# Patient Record
Sex: MALE | Race: WHITE | NOT HISPANIC OR LATINO | Employment: STUDENT | URBAN - METROPOLITAN AREA
[De-identification: names, ages, dates, MRNs, and addresses within clinical notes are randomized per-mention and may not be internally consistent; named-entity substitution may affect disease eponyms.]

---

## 2019-02-07 ENCOUNTER — OFFICE VISIT (OUTPATIENT)
Dept: FAMILY MEDICINE CLINIC | Facility: CLINIC | Age: 24
End: 2019-02-07

## 2019-02-07 VITALS
WEIGHT: 167 LBS | HEART RATE: 68 BPM | TEMPERATURE: 97.8 F | DIASTOLIC BLOOD PRESSURE: 70 MMHG | BODY MASS INDEX: 22.62 KG/M2 | SYSTOLIC BLOOD PRESSURE: 120 MMHG | HEIGHT: 72 IN | RESPIRATION RATE: 16 BRPM

## 2019-02-07 DIAGNOSIS — S99.922A INJURY OF TOE ON LEFT FOOT, INITIAL ENCOUNTER: ICD-10-CM

## 2019-02-07 DIAGNOSIS — K01.1 IMPACTED TOOTH: Primary | ICD-10-CM

## 2019-02-07 PROCEDURE — 99203 OFFICE O/P NEW LOW 30 MIN: CPT | Performed by: NURSE PRACTITIONER

## 2019-02-07 RX ORDER — AMOXICILLIN 875 MG/1
875 TABLET, COATED ORAL 2 TIMES DAILY
Qty: 14 TABLET | Refills: 0 | Status: SHIPPED | OUTPATIENT
Start: 2019-02-07 | End: 2019-02-14

## 2019-02-07 RX ORDER — IBUPROFEN 600 MG/1
600 TABLET ORAL EVERY 6 HOURS PRN
Qty: 20 TABLET | Refills: 0 | Status: SHIPPED | OUTPATIENT
Start: 2019-02-07 | End: 2019-02-17

## 2019-02-07 NOTE — PROGRESS NOTES
Subjective   Blas Alcazar is a 23 y.o. male.     History of Present Illness   NP to establish care  Here with mother today    C/O left side gum pain eating chips super bowel Sunday and scratched gum  Mom states he needs wisdom tooth bottom left removed, is also having really bad breath since this happened  Naprosyn for the pain and it helped some, took Amoxicillin 1000 mg yesterday from his dad and he states it helped    C/o left great toe pain, injured 2 weeks ago at [employer] [place]  States it has pus coming out but doesn't know, he pulled the ingrown toe nail and states it doesn't hurt anymore    The following portions of the patient's history were reviewed and updated as appropriate: allergies, current medications, past family history, past medical history, past social history, past surgical history and problem list.    Review of Systems   Constitutional: Negative.  Negative for activity change, chills and fever.   HENT: Positive for dental problem and facial swelling.    Eyes: Negative.    Respiratory: Negative.    Cardiovascular: Negative.    Gastrointestinal: Negative.    Musculoskeletal: Arthralgias: toe pain.   Skin: Negative.    Neurological: Negative.    Hematological: Negative.    Psychiatric/Behavioral: Negative for sleep disturbance.       Objective   Physical Exam   Constitutional: He is oriented to person, place, and time. He appears well-developed and well-nourished. No distress.   HENT:   Head: Normocephalic.   Right Ear: External ear normal.   Left Ear: External ear normal.   Nose: Nose normal.   Mouth/Throat: Oropharynx is clear and moist.   Neck: Normal range of motion. Neck supple. No thyromegaly present.   Cardiovascular: Normal rate, regular rhythm, normal heart sounds and intact distal pulses. Exam reveals no gallop and no friction rub.   No murmur heard.  Pulmonary/Chest: Effort normal and breath sounds normal. No respiratory distress. He has no wheezes. He has no rales.   Abdominal: Soft.  Bowel sounds are normal.   Musculoskeletal: Normal range of motion. He exhibits no edema.   Lymphadenopathy:     He has no cervical adenopathy.   Neurological: He is alert and oriented to person, place, and time. He has normal reflexes.   Skin: Skin is warm, dry and intact. Capillary refill takes less than 2 seconds.   No redness or abnormality to skin left great toe   Psychiatric: He has a normal mood and affect. His behavior is normal. Judgment and thought content normal.   Nursing note and vitals reviewed.        Assessment/Plan   Blas was seen today for re-establish pcp, l side gum pain and l great toenail injury.    Diagnoses and all orders for this visit:    Impacted tooth  -     amoxicillin (AMOXIL) 875 MG tablet; Take 1 tablet by mouth 2 (Two) Times a Day for 7 days.  -     ibuprofen (ADVIL,MOTRIN) 600 MG tablet; Take 1 tablet by mouth Every 6 (Six) Hours As Needed for Mild Pain  for up to 10 days.    Injury of toe on left foot, initial encounter      Medications sent to pharmacy with instructions and possible side effects  Recommend scheduling the appointment with oral surgeon for impacted wisdom tooth  RTC if symptoms worsen or fail to improve  Mom and patient verbalize understanding and agree with plan of care

## 2019-10-07 ENCOUNTER — OFFICE VISIT (OUTPATIENT)
Dept: FAMILY MEDICINE CLINIC | Facility: CLINIC | Age: 24
End: 2019-10-07

## 2019-10-07 VITALS
BODY MASS INDEX: 25.9 KG/M2 | DIASTOLIC BLOOD PRESSURE: 66 MMHG | RESPIRATION RATE: 18 BRPM | TEMPERATURE: 97 F | WEIGHT: 191 LBS | HEART RATE: 84 BPM | SYSTOLIC BLOOD PRESSURE: 118 MMHG | OXYGEN SATURATION: 99 %

## 2019-10-07 DIAGNOSIS — R11.2 NON-INTRACTABLE VOMITING WITH NAUSEA, UNSPECIFIED VOMITING TYPE: Primary | ICD-10-CM

## 2019-10-07 DIAGNOSIS — R53.82 CHRONIC FATIGUE: ICD-10-CM

## 2019-10-07 DIAGNOSIS — R10.13 EPIGASTRIC PAIN: ICD-10-CM

## 2019-10-07 DIAGNOSIS — R10.12 LUQ PAIN: ICD-10-CM

## 2019-10-07 PROCEDURE — 99214 OFFICE O/P EST MOD 30 MIN: CPT | Performed by: PHYSICIAN ASSISTANT

## 2019-10-07 RX ORDER — OMEPRAZOLE 20 MG/1
20 CAPSULE, DELAYED RELEASE ORAL DAILY
Qty: 30 CAPSULE | Refills: 0 | Status: SHIPPED | OUTPATIENT
Start: 2019-10-07 | End: 2019-11-05 | Stop reason: SDUPTHER

## 2019-10-07 RX ORDER — ONDANSETRON 4 MG/1
4-8 TABLET, ORALLY DISINTEGRATING ORAL EVERY 8 HOURS PRN
Qty: 20 TABLET | Refills: 0 | Status: SHIPPED | OUTPATIENT
Start: 2019-10-07 | End: 2021-07-26

## 2019-10-07 RX ORDER — SUCRALFATE 1 G/1
1 TABLET ORAL 4 TIMES DAILY
Qty: 60 TABLET | Refills: 0 | Status: SHIPPED | OUTPATIENT
Start: 2019-10-07 | End: 2019-11-25 | Stop reason: SDUPTHER

## 2019-10-07 NOTE — PROGRESS NOTES
I have reviewed the notes, assessments, and/or procedures performed by NEAL Baird, I concur with her/his documentation of Blas Alcazar.

## 2019-10-07 NOTE — PROGRESS NOTES
"Subjective   Blas Alcazar is a 23 y.o. male.     History of Present Illness   Pt presents with CC of abdominal pain, burning/ gnawing sensation in stomach X 2 weeks. Eating makes him feel better, however has very little appetite  Down 10 lbs since symptoms started. Vomiting \"acid\" mostly in the mornings about 5 minutes after wakening (this started in last 3 days). Denies heartburn. Abdominal pain sometimes wakes him up at night. Felt in epigastric and LUQ area.   No frequent belching or flatulence. Some loose stools. No constipation.   States he has always had \"stomach issues\" growing up. Never saw GI. This discomfort feels different   Concerned for stomach ulcer. Denies frequent NSAID or alcohol consumption. No hx of hpylori or pancreatitis. Not on any current medications   Has tried increased Water intake, vitamins, pepto, OTC acid reducer, with no real relief of symptoms  Denies urinary symptoms. No blood in stool. No fever  Family had stomach bug recently and thinks he caught it (why symptoms worse in last 3 days), however symptoms have been going on longer than this   No recent travel. No consumption of suspected contaminated food source  Still has gallbladder and appendix  No suspected food allergies     Smoking vapor cigarettes rarely     Pt notes he has been very fatigued as well     The following portions of the patient's history were reviewed and updated as appropriate: allergies, current medications, past family history, past medical history, past social history, past surgical history and problem list.    Review of Systems   Constitutional: Positive for fatigue. Negative for chills, diaphoresis and fever.   HENT: Negative.  Negative for congestion, ear discharge, ear pain, hearing loss, nosebleeds, postnasal drip, sinus pressure, sneezing and sore throat.    Eyes: Negative.    Respiratory: Negative.  Negative for cough, chest tightness, shortness of breath and wheezing.    Cardiovascular: Negative.  " Negative for chest pain, palpitations and leg swelling.   Gastrointestinal: Positive for abdominal pain, diarrhea, nausea and vomiting. Negative for abdominal distention, blood in stool and constipation.   Genitourinary: Negative.  Negative for difficulty urinating, dysuria, flank pain, frequency, hematuria and urgency.   Musculoskeletal: Negative.  Negative for arthralgias, back pain, gait problem, joint swelling, myalgias, neck pain and neck stiffness.   Skin: Negative.  Negative for color change, pallor, rash and wound.   Neurological: Negative for dizziness, syncope, weakness, light-headedness, numbness and headaches.       Objective    Blood pressure 118/66, pulse 84, temperature 97 °F (36.1 °C), resp. rate 18, weight 86.6 kg (191 lb), SpO2 99 %.     Physical Exam   Constitutional: He is oriented to person, place, and time. He appears well-developed and well-nourished.   HENT:   Head: Normocephalic and atraumatic.   Right Ear: Tympanic membrane, external ear and ear canal normal.   Left Ear: Tympanic membrane, external ear and ear canal normal.   Nose: Nose normal.   Mouth/Throat: Oropharynx is clear and moist. No oropharyngeal exudate.   Eyes: Conjunctivae are normal.   Neck: Normal range of motion. Neck supple. No tracheal deviation present. No thyromegaly present.   Cardiovascular: Normal rate, regular rhythm, normal heart sounds and intact distal pulses. Exam reveals no gallop and no friction rub.   No murmur heard.  Pulmonary/Chest: Effort normal and breath sounds normal. No respiratory distress. He has no wheezes. He has no rales. He exhibits no tenderness.   Abdominal: Soft. Bowel sounds are normal. He exhibits no distension and no mass. There is no tenderness. There is no rebound and no guarding. No hernia.   Pain not reproduced upon palpation    Lymphadenopathy:     He has no cervical adenopathy.   Neurological: He is alert and oriented to person, place, and time.   Skin: Skin is warm and dry.    Psychiatric: He has a normal mood and affect. His behavior is normal. Judgment and thought content normal.   Nursing note and vitals reviewed.      Assessment/Plan   Blas was seen today for vomiting and stomach acid.    Diagnoses and all orders for this visit:    Non-intractable vomiting with nausea, unspecified vomiting type  -     CBC & Differential  -     Comprehensive Metabolic Panel  -     Lipase  -     Magnesium  -     H. Pylori Breath Test - Breath, Lung  -     ondansetron ODT (ZOFRAN ODT) 4 MG disintegrating tablet; Take 1-2 tablets by mouth Every 8 (Eight) Hours As Needed for Nausea or Vomiting.  -     Ambulatory Referral to Gastroenterology    LUQ pain  -     Lipase  -     H. Pylori Breath Test - Breath, Lung  -     Ambulatory Referral to Gastroenterology    Epigastric pain  -     Lipase  -     H. Pylori Breath Test - Breath, Lung  -     omeprazole (PRILOSEC) 20 MG capsule; Take 1 capsule by mouth Daily.  -     sucralfate (CARAFATE) 1 g tablet; Take 1 tablet by mouth 4 (Four) Times a Day.  -     Ambulatory Referral to Gastroenterology    Chronic fatigue  -     TSH  -     T4, free  -     Karol-Barr Virus VCA Antibody Panel      Labs as outlined in plan along with H pylori breath test   Will do trial of omeprazole and Carafate as well as zofran prn   Avoid spicy/fatty/greasy foods as well as alcohol and NSAIDS   Due to long standing issues, will also place referral to GI for additional evaluation. May benefit from EGD.   Report to ER if new or worsening symptoms develop

## 2019-10-08 ENCOUNTER — APPOINTMENT (OUTPATIENT)
Dept: LAB | Facility: HOSPITAL | Age: 24
End: 2019-10-08

## 2019-10-08 PROCEDURE — 86663 EPSTEIN-BARR ANTIBODY: CPT | Performed by: PHYSICIAN ASSISTANT

## 2019-10-08 PROCEDURE — 84443 ASSAY THYROID STIM HORMONE: CPT | Performed by: PHYSICIAN ASSISTANT

## 2019-10-08 PROCEDURE — 86665 EPSTEIN-BARR CAPSID VCA: CPT | Performed by: PHYSICIAN ASSISTANT

## 2019-10-08 PROCEDURE — 83013 H PYLORI (C-13) BREATH: CPT | Performed by: PHYSICIAN ASSISTANT

## 2019-10-08 PROCEDURE — 36415 COLL VENOUS BLD VENIPUNCTURE: CPT | Performed by: PHYSICIAN ASSISTANT

## 2019-10-08 PROCEDURE — 85025 COMPLETE CBC W/AUTO DIFF WBC: CPT | Performed by: PHYSICIAN ASSISTANT

## 2019-10-08 PROCEDURE — 86664 EPSTEIN-BARR NUCLEAR ANTIGEN: CPT | Performed by: PHYSICIAN ASSISTANT

## 2019-10-08 PROCEDURE — 80053 COMPREHEN METABOLIC PANEL: CPT | Performed by: PHYSICIAN ASSISTANT

## 2019-10-08 PROCEDURE — 83735 ASSAY OF MAGNESIUM: CPT | Performed by: PHYSICIAN ASSISTANT

## 2019-10-08 PROCEDURE — 83690 ASSAY OF LIPASE: CPT | Performed by: PHYSICIAN ASSISTANT

## 2019-10-08 PROCEDURE — 84439 ASSAY OF FREE THYROXINE: CPT | Performed by: PHYSICIAN ASSISTANT

## 2019-10-09 LAB
ALBUMIN SERPL-MCNC: 4.8 G/DL (ref 3.5–5.2)
ALBUMIN/GLOB SERPL: 1.8 G/DL
ALP SERPL-CCNC: 131 U/L (ref 39–117)
ALT SERPL W P-5'-P-CCNC: 17 U/L (ref 1–41)
ANION GAP SERPL CALCULATED.3IONS-SCNC: 12.2 MMOL/L (ref 5–15)
AST SERPL-CCNC: 18 U/L (ref 1–40)
BASOPHILS # BLD AUTO: 0.01 10*3/MM3 (ref 0–0.2)
BASOPHILS NFR BLD AUTO: 0.2 % (ref 0–1.5)
BILIRUB SERPL-MCNC: 0.4 MG/DL (ref 0.2–1.2)
BUN BLD-MCNC: 11 MG/DL (ref 6–20)
BUN/CREAT SERPL: 11.6 (ref 7–25)
CALCIUM SPEC-SCNC: 9.5 MG/DL (ref 8.6–10.5)
CHLORIDE SERPL-SCNC: 100 MMOL/L (ref 98–107)
CO2 SERPL-SCNC: 27.8 MMOL/L (ref 22–29)
CREAT BLD-MCNC: 0.95 MG/DL (ref 0.76–1.27)
DEPRECATED RDW RBC AUTO: 42.1 FL (ref 37–54)
EOSINOPHIL # BLD AUTO: 0.08 10*3/MM3 (ref 0–0.4)
EOSINOPHIL NFR BLD AUTO: 1.4 % (ref 0.3–6.2)
ERYTHROCYTE [DISTWIDTH] IN BLOOD BY AUTOMATED COUNT: 12.3 % (ref 12.3–15.4)
GFR SERPL CREATININE-BSD FRML MDRD: 99 ML/MIN/1.73
GLOBULIN UR ELPH-MCNC: 2.6 GM/DL
GLUCOSE BLD-MCNC: 90 MG/DL (ref 65–99)
HCT VFR BLD AUTO: 47.8 % (ref 37.5–51)
HGB BLD-MCNC: 16.1 G/DL (ref 13–17.7)
IMM GRANULOCYTES # BLD AUTO: 0.02 10*3/MM3 (ref 0–0.05)
IMM GRANULOCYTES NFR BLD AUTO: 0.3 % (ref 0–0.5)
LIPASE SERPL-CCNC: 221 U/L (ref 13–60)
LYMPHOCYTES # BLD AUTO: 1.45 10*3/MM3 (ref 0.7–3.1)
LYMPHOCYTES NFR BLD AUTO: 24.9 % (ref 19.6–45.3)
MAGNESIUM SERPL-MCNC: 2.5 MG/DL (ref 1.6–2.6)
MCH RBC QN AUTO: 31.1 PG (ref 26.6–33)
MCHC RBC AUTO-ENTMCNC: 33.7 G/DL (ref 31.5–35.7)
MCV RBC AUTO: 92.5 FL (ref 79–97)
MONOCYTES # BLD AUTO: 0.6 10*3/MM3 (ref 0.1–0.9)
MONOCYTES NFR BLD AUTO: 10.3 % (ref 5–12)
NEUTROPHILS # BLD AUTO: 3.67 10*3/MM3 (ref 1.7–7)
NEUTROPHILS NFR BLD AUTO: 62.9 % (ref 42.7–76)
NRBC BLD AUTO-RTO: 0.2 /100 WBC (ref 0–0.2)
PLATELET # BLD AUTO: 267 10*3/MM3 (ref 140–450)
PMV BLD AUTO: 12 FL (ref 6–12)
POTASSIUM BLD-SCNC: 4.8 MMOL/L (ref 3.5–5.2)
PROT SERPL-MCNC: 7.4 G/DL (ref 6–8.5)
RBC # BLD AUTO: 5.17 10*6/MM3 (ref 4.14–5.8)
SODIUM BLD-SCNC: 140 MMOL/L (ref 136–145)
T4 FREE SERPL-MCNC: 1.38 NG/DL (ref 0.93–1.7)
TSH SERPL DL<=0.05 MIU/L-ACNC: 2.29 UIU/ML (ref 0.27–4.2)
WBC NRBC COR # BLD: 5.83 10*3/MM3 (ref 3.4–10.8)

## 2019-10-10 LAB
EBV EA IGG SER-ACNC: <9 U/ML (ref 0–8.9)
EBV NA IGG SER IA-ACNC: 122 U/ML (ref 0–17.9)
EBV VCA IGG SER-ACNC: 169 U/ML (ref 0–17.9)
EBV VCA IGM SER-ACNC: <36 U/ML (ref 0–35.9)
INTERPRETATION: ABNORMAL
UREA BREATH TEST QL: NEGATIVE

## 2019-11-05 DIAGNOSIS — R10.13 EPIGASTRIC PAIN: ICD-10-CM

## 2019-11-05 RX ORDER — OMEPRAZOLE 20 MG/1
CAPSULE, DELAYED RELEASE ORAL
Qty: 30 CAPSULE | Refills: 0 | Status: SHIPPED | OUTPATIENT
Start: 2019-11-05 | End: 2021-07-26

## 2019-11-25 DIAGNOSIS — R10.13 EPIGASTRIC PAIN: ICD-10-CM

## 2019-11-25 RX ORDER — SUCRALFATE 1 G/1
1 TABLET ORAL 4 TIMES DAILY
Qty: 60 TABLET | Refills: 0 | Status: SHIPPED | OUTPATIENT
Start: 2019-11-25 | End: 2020-06-17

## 2020-01-09 ENCOUNTER — OFFICE VISIT (OUTPATIENT)
Dept: FAMILY MEDICINE CLINIC | Facility: CLINIC | Age: 25
End: 2020-01-09

## 2020-01-09 VITALS
WEIGHT: 148.2 LBS | HEART RATE: 80 BPM | DIASTOLIC BLOOD PRESSURE: 72 MMHG | BODY MASS INDEX: 20.07 KG/M2 | TEMPERATURE: 97.2 F | RESPIRATION RATE: 18 BRPM | SYSTOLIC BLOOD PRESSURE: 126 MMHG | HEIGHT: 72 IN

## 2020-01-09 DIAGNOSIS — R52 GENERALIZED BODY ACHES: ICD-10-CM

## 2020-01-09 DIAGNOSIS — R50.9 FEVER, UNSPECIFIED FEVER CAUSE: ICD-10-CM

## 2020-01-09 DIAGNOSIS — J10.1 INFLUENZA B: Primary | ICD-10-CM

## 2020-01-09 LAB
EXPIRATION DATE: ABNORMAL
EXPIRATION DATE: NORMAL
EXPIRATION DATE: NORMAL
FLUAV AG NPH QL: NEGATIVE
FLUBV AG NPH QL: POSITIVE
HETEROPH AB SER QL LA: NEGATIVE
INTERNAL CONTROL: ABNORMAL
INTERNAL CONTROL: NORMAL
INTERNAL CONTROL: NORMAL
Lab: ABNORMAL
Lab: NORMAL
Lab: NORMAL
S PYO AG THROAT QL: NEGATIVE

## 2020-01-09 PROCEDURE — 87804 INFLUENZA ASSAY W/OPTIC: CPT | Performed by: PHYSICIAN ASSISTANT

## 2020-01-09 PROCEDURE — 86308 HETEROPHILE ANTIBODY SCREEN: CPT | Performed by: PHYSICIAN ASSISTANT

## 2020-01-09 PROCEDURE — 99213 OFFICE O/P EST LOW 20 MIN: CPT | Performed by: PHYSICIAN ASSISTANT

## 2020-01-09 PROCEDURE — 87880 STREP A ASSAY W/OPTIC: CPT | Performed by: PHYSICIAN ASSISTANT

## 2020-01-09 RX ORDER — OSELTAMIVIR PHOSPHATE 75 MG/1
75 CAPSULE ORAL 2 TIMES DAILY
Qty: 10 CAPSULE | Refills: 0 | Status: SHIPPED | OUTPATIENT
Start: 2020-01-09 | End: 2020-01-09 | Stop reason: SDUPTHER

## 2020-01-09 RX ORDER — BENZONATATE 100 MG/1
CAPSULE ORAL
COMMUNITY
Start: 2019-12-30 | End: 2021-07-26

## 2020-01-09 RX ORDER — PREDNISONE 10 MG/1
TABLET ORAL
Qty: 21 EACH | Refills: 0 | Status: SHIPPED | OUTPATIENT
Start: 2020-01-09 | End: 2020-01-09 | Stop reason: SDUPTHER

## 2020-01-09 RX ORDER — AMOXICILLIN AND CLAVULANATE POTASSIUM 875; 125 MG/1; MG/1
TABLET, FILM COATED ORAL
COMMUNITY
Start: 2019-12-30 | End: 2021-07-26

## 2020-01-09 RX ORDER — OSELTAMIVIR PHOSPHATE 75 MG/1
75 CAPSULE ORAL 2 TIMES DAILY
Qty: 10 CAPSULE | Refills: 0 | Status: SHIPPED | OUTPATIENT
Start: 2020-01-09 | End: 2021-07-26

## 2020-01-09 RX ORDER — PREDNISONE 10 MG/1
TABLET ORAL
Qty: 21 EACH | Refills: 0 | Status: SHIPPED | OUTPATIENT
Start: 2020-01-09 | End: 2021-07-26

## 2020-01-09 NOTE — PROGRESS NOTES
Subjective   Blas Alcazar is a 24 y.o. male.     History of Present Illness   Pt presents with CC of body aches and fever  Had similar symptoms last month and went to [facility] ER on 12/29. Had labs, urine checked, chest XR, flu and strep test. All reportedly normal. Was told his throat looked red, so was started on Augmentin for possible strep. Was taking only once a day by mistake, on last day and started taking 2 per day   Was feeling better until last couple of day. Body aches and fever. Fever up to 102. Sore throat this AM   Taking tylenol and motrin   Very tired.   Pain in both of calves. No leg swelling     No cough, SOB, wheezing   Does have cats at home. No recent scratch, bite or rash   No known tick bite   No recent travel   No new medication changes. No recent vaccines   No hx of autoimmune issue     Did not get flu shot this year     The following portions of the patient's history were reviewed and updated as appropriate: allergies, current medications, past family history, past medical history, past social history, past surgical history and problem list.    Review of Systems   Constitutional: Positive for fatigue and fever. Negative for chills and diaphoresis.   HENT: Positive for sore throat. Negative for congestion, ear discharge, ear pain, hearing loss, nosebleeds, postnasal drip, sinus pressure and sneezing.    Eyes: Negative.    Respiratory: Negative.  Negative for cough, chest tightness, shortness of breath and wheezing.    Cardiovascular: Negative.  Negative for chest pain, palpitations and leg swelling.   Gastrointestinal: Negative for abdominal distention, abdominal pain, blood in stool, constipation, diarrhea, nausea and vomiting.   Genitourinary: Negative.  Negative for difficulty urinating, dysuria, flank pain, frequency, hematuria and urgency.   Musculoskeletal: Positive for myalgias. Negative for arthralgias, back pain, gait problem, joint swelling, neck pain and neck stiffness.   Skin:  "Negative.  Negative for color change, pallor, rash and wound.   Neurological: Negative for dizziness, syncope, weakness, light-headedness, numbness and headaches.       Objective    Blood pressure 126/72, pulse 80, temperature 97.2 °F (36.2 °C), resp. rate 18, height 182.9 cm (72\"), weight 67.2 kg (148 lb 3.2 oz).     Physical Exam   Constitutional: He is oriented to person, place, and time. He appears well-developed and well-nourished.   HENT:   Head: Normocephalic and atraumatic.   Right Ear: Tympanic membrane, external ear and ear canal normal.   Left Ear: Tympanic membrane, external ear and ear canal normal.   Nose: Nose normal. Right sinus exhibits no maxillary sinus tenderness and no frontal sinus tenderness. Left sinus exhibits no maxillary sinus tenderness and no frontal sinus tenderness.   Mouth/Throat: Oropharynx is clear and moist. No oropharyngeal exudate, posterior oropharyngeal edema or posterior oropharyngeal erythema.   Eyes: Conjunctivae are normal.   Neck: Normal range of motion. Neck supple. No tracheal deviation present. No thyromegaly present.   Cardiovascular: Normal rate, regular rhythm and normal heart sounds.   Pulmonary/Chest: Effort normal and breath sounds normal. No respiratory distress. He has no wheezes. He has no rales. He exhibits no tenderness.   Abdominal: Soft. Bowel sounds are normal. He exhibits no distension and no mass. There is no tenderness. There is no rebound and no guarding. No hernia.   Musculoskeletal: Normal range of motion. He exhibits no tenderness.   Lymphadenopathy:     He has no cervical adenopathy.   Neurological: He is alert and oriented to person, place, and time.   Skin: Skin is warm and dry.   Psychiatric: He has a normal mood and affect. His behavior is normal. Judgment and thought content normal.   Nursing note and vitals reviewed.      Assessment/Plan   Blas was seen today for uri.    Diagnoses and all orders for this visit:    Influenza B  -     " oseltamivir (TAMIFLU) 75 MG capsule; Take 1 capsule by mouth 2 (Two) Times a Day.    Generalized body aches  -     POCT Infectious mononucleosis antibody  -     POCT rapid strep A  -     POCT Influenza A/B    -     predniSONE (DELTASONE) 10 MG (21) tablet pack; Use as directed on package    Fever, unspecified fever cause  -     POCT Infectious mononucleosis antibody  -     POCT rapid strep A  -     POCT Influenza A/B      Strep A, mono, and flu A negative. Pt tested positive for flu B.   Since symptoms have seem to taken a turn in the last couple of days, will initiate tamiflu and start steroid taper. Very possible patient developed flu when he was recovering from previous illness.   F/u for labs if symptoms do not improve

## 2020-06-17 DIAGNOSIS — R10.13 EPIGASTRIC PAIN: ICD-10-CM

## 2020-06-17 RX ORDER — SUCRALFATE 1 G/1
1 TABLET ORAL 4 TIMES DAILY
Qty: 60 TABLET | Refills: 0 | Status: SHIPPED | OUTPATIENT
Start: 2020-06-17 | End: 2020-07-08

## 2020-07-08 DIAGNOSIS — R10.13 EPIGASTRIC PAIN: ICD-10-CM

## 2020-07-08 RX ORDER — SUCRALFATE 1 G/1
TABLET ORAL
Qty: 60 TABLET | Refills: 0 | Status: SHIPPED | OUTPATIENT
Start: 2020-07-08 | End: 2020-08-31

## 2020-08-30 ENCOUNTER — NURSE TRIAGE (OUTPATIENT)
Dept: CALL CENTER | Facility: HOSPITAL | Age: 25
End: 2020-08-30

## 2020-08-30 DIAGNOSIS — R10.13 EPIGASTRIC PAIN: ICD-10-CM

## 2020-08-30 NOTE — TELEPHONE ENCOUNTER
"The Mom is the caller and her son has ran out of Carafate, She states he becomes ill without. She would like the on call contacted. He is on Carafate 1 gm tablet. Take 1 tablet by mouth four times a day.     Reason for Disposition  • [1] Request for URGENT new prescription or refill of \"essential\" medication (i.e., likelihood of harm to patient if not taken) AND [2] triager unable to fill per unit policy    Additional Information  • Negative: Drug overdose and triager unable to answer question  • Negative: Caller requesting information unrelated to medicine  • Negative: Caller requesting a prescription for Strep throat and has a positive culture result  • Negative: Rash while taking a medication or within 3 days of stopping it  • Negative: Immunization reaction suspected  • Negative: [1] Asthma and [2] having symptoms of asthma (cough, wheezing, etc.)  • Negative: [1] Influenza symptoms AND [2] anti-viral med prescription request, such as Tamiflu  • Negative: [1] Symptom of illness (e.g., headache, abdominal pain, earache, vomiting) AND [2] more than mild  • Negative: MORE THAN A DOUBLE DOSE of a prescription or over-the-counter (OTC) drug  • Negative: [1] DOUBLE DOSE (an extra dose or lesser amount) of over-the-counter (OTC) drug AND [2] any symptoms (e.g., dizziness, nausea, pain, sleepiness)  • Negative: [1] DOUBLE DOSE (an extra dose or lesser amount) of prescription drug AND [2] any symptoms (e.g., dizziness, nausea, pain, sleepiness)  • Negative: Took another person's prescription drug  • Negative: [1] DOUBLE DOSE (an extra dose or lesser amount) of prescription drug AND [2] NO symptoms (Exception: a double dose of antibiotics)  • Negative: Diabetes drug error or overdose (e.g., took wrong type of insulin or took extra dose)  • Negative: [1] Prescription not at pharmacy AND [2] was prescribed by PCP recently  • Negative: [1] Pharmacy calling with prescription questions AND [2] triager unable to answer " "question  • Negative: [1] Caller has URGENT medication question about med that PCP or specialist prescribed AND [2] triager unable to answer question    Answer Assessment - Initial Assessment Questions  1.   NAME of MEDICATION: \"What medicine are you calling about?\"      Carafate   2.   QUESTION: \"What is your question?\"      Need refill   3.   PRESCRIBING HCP: \"Who prescribed it?\" Reason: if prescribed by specialist, call should be referred to that group.      Dr. berg  4. SYMPTOMS: \"Do you have any symptoms?\"      none  5. SEVERITY: If symptoms are present, ask \"Are they mild, moderate or severe?\"      n  6.  PREGNANCY:  \"Is there any chance that you are pregnant?\" \"When was your last menstrual period?\"      n    Protocols used: MEDICATION QUESTION CALL-ADULT-      "

## 2020-08-31 RX ORDER — SUCRALFATE 1 G/1
TABLET ORAL
Qty: 60 TABLET | Refills: 0 | Status: SHIPPED | OUTPATIENT
Start: 2020-08-31 | End: 2021-07-26

## 2021-07-26 ENCOUNTER — OFFICE VISIT (OUTPATIENT)
Dept: FAMILY MEDICINE CLINIC | Facility: CLINIC | Age: 26
End: 2021-07-26

## 2021-07-26 VITALS
TEMPERATURE: 99.6 F | WEIGHT: 157 LBS | HEIGHT: 72 IN | BODY MASS INDEX: 21.26 KG/M2 | RESPIRATION RATE: 18 BRPM | HEART RATE: 84 BPM

## 2021-07-26 DIAGNOSIS — K58.0 IRRITABLE BOWEL SYNDROME WITH DIARRHEA: Primary | ICD-10-CM

## 2021-07-26 DIAGNOSIS — K21.9 GASTROESOPHAGEAL REFLUX DISEASE, UNSPECIFIED WHETHER ESOPHAGITIS PRESENT: ICD-10-CM

## 2021-07-26 DIAGNOSIS — Z20.2 EXPOSURE TO STD: ICD-10-CM

## 2021-07-26 PROBLEM — L20.9 AD (ATOPIC DERMATITIS): Status: ACTIVE | Noted: 2021-07-26

## 2021-07-26 PROBLEM — F98.8 ADD (ATTENTION DEFICIT DISORDER): Status: ACTIVE | Noted: 2021-07-26

## 2021-07-26 PROBLEM — F41.1 ANXIETY, GENERALIZED: Status: ACTIVE | Noted: 2021-07-26

## 2021-07-26 PROCEDURE — 99214 OFFICE O/P EST MOD 30 MIN: CPT | Performed by: FAMILY MEDICINE

## 2021-07-26 RX ORDER — OMEPRAZOLE 40 MG/1
40 CAPSULE, DELAYED RELEASE ORAL DAILY
Qty: 30 CAPSULE | Refills: 5 | Status: SHIPPED | OUTPATIENT
Start: 2021-07-26

## 2021-07-26 NOTE — PROGRESS NOTES
Subjective   Blas Alcazar is a 25 y.o. male.     History of Present Illness     He has had life long issues with his stomach  He has a lot of diarrhea and loose stools  Has some BRBPR as well and this is quite common  He also has heart burn and nausea and vomiting  Worse in the AM  He has to have something in his stomach to avoid GI issues    He has had unprotected sex and is worried about STDs  Would like to be checked  No discharge, no lesions, no known STD exposure      Review of Systems   Constitutional: Negative.    Psychiatric/Behavioral: Negative.        Objective   Physical Exam  Vitals and nursing note reviewed.   Constitutional:       General: He is not in acute distress.     Appearance: Normal appearance. He is well-developed.   Cardiovascular:      Rate and Rhythm: Normal rate and regular rhythm.      Heart sounds: Normal heart sounds.   Pulmonary:      Effort: Pulmonary effort is normal.      Breath sounds: Normal breath sounds.   Neurological:      Mental Status: He is alert and oriented to person, place, and time.   Psychiatric:         Mood and Affect: Mood normal.         Behavior: Behavior normal.         Thought Content: Thought content normal.         Judgment: Judgment normal.         Assessment/Plan   Diagnoses and all orders for this visit:    1. Irritable bowel syndrome with diarrhea (Primary)  -     Ambulatory Referral to Gastroenterology    2. Gastroesophageal reflux disease, unspecified whether esophagitis present  -     Ambulatory Referral to Gastroenterology    3. Exposure to STD  -     Chlamydia trachomatis, Neisseria gonorrhoeae, Trichomonas vaginalis, PCR - Urine, Urine, Clean Catch  -     HSV 1 & 2 - Specific Antibody, IgG  -     Hepatitis C Antibody  -     HIV-1 / O / 2 Ag / Antibody 4th Generation  -     RPR    Other orders  -     omeprazole (priLOSEC) 40 MG capsule; Take 1 capsule by mouth Daily.  Dispense: 30 capsule; Refill: 5    with chronic ongoing GI issues, I am going to set  him up with GI for EGD and colonoscopy to further delineate cause of his issues. Pt agrees.  Will lionel prilosec 40 for symptom relief until work up can be completed.  OTC probiotic also recommended    Will check STD panel and f/u pending labs

## 2021-07-29 LAB
C TRACH RRNA SPEC QL NAA+PROBE: NEGATIVE
HCV AB S/CO SERPL IA: <0.1 S/CO RATIO (ref 0–0.9)
HIV 1+2 AB+HIV1 P24 AG SERPL QL IA: NON REACTIVE
HSV1 IGG SER IA-ACNC: <0.91 INDEX (ref 0–0.9)
HSV2 IGG SER IA-ACNC: <0.91 INDEX (ref 0–0.9)
N GONORRHOEA RRNA SPEC QL NAA+PROBE: NEGATIVE
RPR SER QL: NON REACTIVE
T VAGINALIS DNA SPEC QL NAA+PROBE: NEGATIVE

## 2021-11-01 ENCOUNTER — TELEPHONE (OUTPATIENT)
Dept: FAMILY MEDICINE CLINIC | Facility: CLINIC | Age: 26
End: 2021-11-01

## 2021-11-01 NOTE — TELEPHONE ENCOUNTER
Caller: Blas Alcazar    Relationship: Self    Best call back number: [phone]    What test was performed: STOMACH SCOPE, URINALYSIS, STOOL SAMPLE, BLOOD WORK AND BIOPSY      When was the test performed: TWO WEEKS AGO    Where was the test performed: [facility]

## 2021-11-05 NOTE — TELEPHONE ENCOUNTER
Sounds like he is asking about testing completed by the gastroenterologist at [facility].  He should reach out to them for these results and their recommendations.

## 2021-11-05 NOTE — TELEPHONE ENCOUNTER
PT IS REALLY FRUSTRATED HE STATES HE'S GETTING RAN IN CIRCLES THAT HIS GASTRO DR AT [facility] TOLD HIM TO GET IN CONTACT WITH HIS PCP FOR RESULTS. THEN I ASK HIM WOULD THEY NOT GIVE HIM RESULTS AND HE SAID ITS OKAY JUST GIVE ME DR BARTON PHONE NUMBER.

## 2023-09-25 ENCOUNTER — OFFICE VISIT (OUTPATIENT)
Dept: FAMILY MEDICINE CLINIC | Facility: CLINIC | Age: 28
End: 2023-09-25

## 2023-09-25 VITALS
HEART RATE: 76 BPM | SYSTOLIC BLOOD PRESSURE: 106 MMHG | RESPIRATION RATE: 18 BRPM | BODY MASS INDEX: 20.32 KG/M2 | DIASTOLIC BLOOD PRESSURE: 64 MMHG | TEMPERATURE: 98.4 F | WEIGHT: 150 LBS | HEIGHT: 72 IN

## 2023-09-25 DIAGNOSIS — G25.81 RESTLESS LEG SYNDROME: ICD-10-CM

## 2023-09-25 DIAGNOSIS — R53.82 CHRONIC FATIGUE: ICD-10-CM

## 2023-09-25 DIAGNOSIS — R61 NIGHT SWEATS: ICD-10-CM

## 2023-09-25 DIAGNOSIS — R45.4 IRRITABILITY: ICD-10-CM

## 2023-09-25 DIAGNOSIS — F41.1 GENERALIZED ANXIETY DISORDER: ICD-10-CM

## 2023-09-25 DIAGNOSIS — K58.2 IRRITABLE BOWEL SYNDROME WITH BOTH CONSTIPATION AND DIARRHEA: Primary | ICD-10-CM

## 2023-09-25 PROCEDURE — 99214 OFFICE O/P EST MOD 30 MIN: CPT | Performed by: FAMILY MEDICINE

## 2023-09-25 RX ORDER — ESCITALOPRAM OXALATE 10 MG/1
10 TABLET ORAL DAILY
Qty: 30 TABLET | Refills: 1 | Status: SHIPPED | OUTPATIENT
Start: 2023-09-25

## 2023-09-25 RX ORDER — PRAMIPEXOLE DIHYDROCHLORIDE 0.25 MG/1
TABLET ORAL
Qty: 120 TABLET | Refills: 1 | Status: SHIPPED | OUTPATIENT
Start: 2023-09-25 | End: 2023-10-02

## 2023-09-25 NOTE — PROGRESS NOTES
Subjective   Blas Alcazar is a 27 y.o. male.     History of Present Illness     Pt has a long history of GI issues  Was seen by me in 2021 and then sent to GI and no cause found  He had EGD and colonoscopy that were normal  No cause found  He has some constipation and some diarrhea    Just feels like he needs to eat, some bloating    His stomach is painful  Hard to sleep as his legs are uncomfortable and he needs to stretch his legs    Poor sleep in general    Has to move his legs all the time    Mood has been poor  Anxious all the time  Zoloft did not work in the past  He is angry and irritable    His wife is worried about adrenal issue      The following portions of the patient's history were reviewed and updated as appropriate: allergies, current medications, past family history, past medical history, past social history, past surgical history, and problem list.    Review of Systems   Constitutional:  Positive for fatigue.   Gastrointestinal:  Positive for abdominal pain, constipation and diarrhea. Negative for blood in stool.   Musculoskeletal:  Positive for myalgias.   Psychiatric/Behavioral:  Positive for agitation, dysphoric mood and sleep disturbance. The patient is nervous/anxious.      Objective   Physical Exam  Vitals and nursing note reviewed.   Constitutional:       General: He is not in acute distress.     Appearance: Normal appearance. He is well-developed.   Cardiovascular:      Rate and Rhythm: Normal rate and regular rhythm.      Heart sounds: Normal heart sounds.   Pulmonary:      Effort: Pulmonary effort is normal.      Breath sounds: Normal breath sounds.   Abdominal:      General: Abdomen is flat. Bowel sounds are normal. There is no distension.      Palpations: Abdomen is soft.      Tenderness: There is abdominal tenderness. There is no guarding or rebound.       Neurological:      Mental Status: He is alert and oriented to person, place, and time.   Psychiatric:         Mood and Affect: Mood  normal.         Behavior: Behavior normal.         Thought Content: Thought content normal.         Judgment: Judgment normal.       Assessment & Plan   Diagnoses and all orders for this visit:    1. Irritable bowel syndrome with both constipation and diarrhea (Primary)  -     riFAXIMin (Xifaxan) 550 MG tablet; Take 1 tablet by mouth 3 (Three) Times a Day.  Dispense: 42 tablet; Refill: 0  -     CBC & Differential  -     Comprehensive Metabolic Panel  -     Lipase  -     Amylase  -     ESE With / DsDNA, RNP, Sjogrens A / B, Smith  -     Sedimentation Rate  -     Ambulatory Referral to Gastroenterology  -     Allergen Food Panel  -     Celiac Comprehensive Panel    2. Restless leg syndrome  -     pramipexole (Mirapex) 0.25 MG tablet; 1 PO QHS 4 nights then 2 po qhs 4 nights then 4 PO QHS  Dispense: 120 tablet; Refill: 1    3. Generalized anxiety disorder  -     escitalopram (Lexapro) 10 MG tablet; Take 1 tablet by mouth Daily.  Dispense: 30 tablet; Refill: 1    4. Irritability  -     escitalopram (Lexapro) 10 MG tablet; Take 1 tablet by mouth Daily.  Dispense: 30 tablet; Refill: 1    5. Chronic fatigue  -     CBC & Differential  -     Comprehensive Metabolic Panel  -     ESE With / DsDNA, RNP, Sjogrens A / B, Smith  -     Sedimentation Rate  -     TSH  -     Cortisol - AM    6. Night sweats  -     ESE With / DsDNA, RNP, Sjogrens A / B, Smith  -     Sedimentation Rate  -     TSH  -     Cortisol - AM    Pt has not been seen since 2021 and was not happy with eval by Dr. Jean after that appointment.  I did discuss with him and his GF that his myriad list of issues would likely take some time to work through and detemrine cause and effecitve treatments.    Will start lexapro for mood, consider buspar in future  Check food allergy and request old GI records.  Trial of xifaxan to see if this helps IBS  Ok mirapex for RLS, titrating dose option disucssed    Labs as above.  F/u pending results    Plan close f/u in one  month    Weight in 2021 was 157

## 2023-09-26 ENCOUNTER — PATIENT MESSAGE (OUTPATIENT)
Dept: FAMILY MEDICINE CLINIC | Facility: CLINIC | Age: 28
End: 2023-09-26
Payer: COMMERCIAL

## 2023-09-28 ENCOUNTER — TELEPHONE (OUTPATIENT)
Dept: FAMILY MEDICINE CLINIC | Facility: CLINIC | Age: 28
End: 2023-09-28
Payer: COMMERCIAL

## 2023-09-28 NOTE — TELEPHONE ENCOUNTER
Caller: Blas Alcazar    Relationship: Self    Best call back number: [phone]    What medications are you currently taking:   Current Outpatient Medications on File Prior to Visit   Medication Sig Dispense Refill    escitalopram (Lexapro) 10 MG tablet Take 1 tablet by mouth Daily. 30 tablet 1    pramipexole (Mirapex) 0.25 MG tablet 1 PO QHS 4 nights then 2 po qhs 4 nights then 4 PO  tablet 1    riFAXIMin (Xifaxan) 550 MG tablet Take 1 tablet by mouth 3 (Three) Times a Day. 42 tablet 0     No current facility-administered medications on file prior to visit.          What are your concerns: PT CALLED STATED THAT THE THE MEDICATION FOR RESTLESS LEG IS NOT HELPING AT ALL, WILL LIKE TO KNOW WHAT DR SUGGEST,PT STATE THAT HE IS NOT ABLE TO SLEEP BECAUSE OF IT. ALSO RX FOR STOMACH ISSUES IS NOT COVERED UNDER HIS INSURANCE.

## 2023-09-29 ENCOUNTER — TELEPHONE (OUTPATIENT)
Dept: FAMILY MEDICINE CLINIC | Facility: CLINIC | Age: 28
End: 2023-09-29
Payer: COMMERCIAL

## 2023-09-29 DIAGNOSIS — G25.81 RESTLESS LEG SYNDROME: Primary | ICD-10-CM

## 2023-09-29 NOTE — TELEPHONE ENCOUNTER
PA for Xifaxan submitted to plan via [facility].  Key: [id number]      Pt informed of message below from Dr Mckee.

## 2023-09-29 NOTE — TELEPHONE ENCOUNTER
----- Message from Blas Alcazar sent at 9/29/2023  1:45 PM EDT -----  Regarding: Restless leg medication & stomach medication   Contact: [phone]  I understand it takes time to build in your system and we are going on 4 days now with worse symptoms then I started with my sleep is suffering severely.. I’m getting way worse quality of life taking them it just doesn’t make sense to me

## 2023-09-29 NOTE — TELEPHONE ENCOUNTER
He has tried it for 3 days!!!!!  A slow taper up was written, will need to follow these directions as we have to slowly increase it to target dose.  Can take some time to see improvement.    If not better after 2 weeks, than would consider requip.

## 2023-09-30 LAB
ALBUMIN SERPL-MCNC: 4.8 G/DL (ref 4.3–5.2)
ALBUMIN/GLOB SERPL: 2.4 {RATIO} (ref 1.2–2.2)
ALP SERPL-CCNC: 124 IU/L (ref 44–121)
ALT SERPL-CCNC: 50 IU/L (ref 0–44)
AMYLASE SERPL-CCNC: 133 U/L (ref 31–110)
ANA SER QL: NEGATIVE
AST SERPL-CCNC: 36 IU/L (ref 0–40)
BARLEY IGE QN: <0.1 KU/L
BASOPHILS # BLD AUTO: 0 X10E3/UL (ref 0–0.2)
BASOPHILS NFR BLD AUTO: 1 %
BEEF IGE QN: 0.13 KU/L
BILIRUB SERPL-MCNC: 0.3 MG/DL (ref 0–1.2)
BUN SERPL-MCNC: 12 MG/DL (ref 6–20)
BUN/CREAT SERPL: 14 (ref 9–20)
CABBAGE IGE QN: <0.1 KU/L
CALCIUM SERPL-MCNC: 9.4 MG/DL (ref 8.7–10.2)
CARROT IGE QN: <0.1 KU/L
CHICKEN MEAT IGE QN: <0.1 KU/L
CHLORIDE SERPL-SCNC: 102 MMOL/L (ref 96–106)
CO2 SERPL-SCNC: 25 MMOL/L (ref 20–29)
CODFISH IGE QN: <0.1 KU/L
CONV CLASS DESCRIPTION: ABNORMAL
CORN IGE QN: <0.1 KU/L
CORTIS AM PEAK SERPL-MCNC: 7.8 UG/DL (ref 6.2–19.4)
COW MILK IGE QN: <0.1 KU/L
CRAB IGE QN: <0.1 KU/L
CREAT SERPL-MCNC: 0.86 MG/DL (ref 0.76–1.27)
EGFRCR SERPLBLD CKD-EPI 2021: 122 ML/MIN/1.73
EGG WHITE IGE QN: <0.1 KU/L
ENDOMYSIUM IGA SER QL: NEGATIVE
EOSINOPHIL # BLD AUTO: 0.1 X10E3/UL (ref 0–0.4)
EOSINOPHIL NFR BLD AUTO: 2 %
ERYTHROCYTE [DISTWIDTH] IN BLOOD BY AUTOMATED COUNT: 12.7 % (ref 11.6–15.4)
ERYTHROCYTE [SEDIMENTATION RATE] IN BLOOD BY WESTERGREN METHOD: 2 MM/HR (ref 0–15)
GLIADIN PEPTIDE IGA SER-ACNC: 4 UNITS (ref 0–19)
GLIADIN PEPTIDE IGG SER-ACNC: 2 UNITS (ref 0–19)
GLOBULIN SER CALC-MCNC: 2 G/DL (ref 1.5–4.5)
GLUCOSE SERPL-MCNC: 95 MG/DL (ref 70–99)
GRAPE IGE QN: <0.1 KU/L
GREEN PEPPER IGE QN: <0.1 KU/L
HCT VFR BLD AUTO: 46.7 % (ref 37.5–51)
HGB BLD-MCNC: 15.8 G/DL (ref 13–17.7)
IGA SERPL-MCNC: 285 MG/DL (ref 90–386)
IMM GRANULOCYTES # BLD AUTO: 0 X10E3/UL (ref 0–0.1)
IMM GRANULOCYTES NFR BLD AUTO: 0 %
LETTUCE IGE QN: <0.1 KU/L
LIPASE SERPL-CCNC: 111 U/L (ref 13–78)
LYMPHOCYTES # BLD AUTO: 1.4 X10E3/UL (ref 0.7–3.1)
LYMPHOCYTES NFR BLD AUTO: 25 %
MCH RBC QN AUTO: 31.7 PG (ref 26.6–33)
MCHC RBC AUTO-ENTMCNC: 33.8 G/DL (ref 31.5–35.7)
MCV RBC AUTO: 94 FL (ref 79–97)
MONOCYTES # BLD AUTO: 0.4 X10E3/UL (ref 0.1–0.9)
MONOCYTES NFR BLD AUTO: 8 %
NEUTROPHILS # BLD AUTO: 3.7 X10E3/UL (ref 1.4–7)
NEUTROPHILS NFR BLD AUTO: 64 %
OAT IGE QN: <0.1 KU/L
ORANGE IGE QN: <0.1 KU/L
PEANUT IGE QN: <0.1 KU/L
PLATELET # BLD AUTO: 283 X10E3/UL (ref 150–450)
PORK IGE QN: <0.1 KU/L
POTASSIUM SERPL-SCNC: 4.6 MMOL/L (ref 3.5–5.2)
POTATO IGE QN: <0.1 KU/L
PROT SERPL-MCNC: 6.8 G/DL (ref 6–8.5)
RBC # BLD AUTO: 4.99 X10E6/UL (ref 4.14–5.8)
RICE IGE QN: <0.1 KU/L
RYE IGE QN: <0.1 KU/L
SHRIMP IGE QN: <0.1 KU/L
SODIUM SERPL-SCNC: 140 MMOL/L (ref 134–144)
SOYBEAN IGE QN: <0.1 KU/L
TOMATO IGE QN: <0.1 KU/L
TSH SERPL DL<=0.005 MIU/L-ACNC: 2.56 UIU/ML (ref 0.45–4.5)
TTG IGA SER-ACNC: <2 U/ML (ref 0–3)
TTG IGG SER-ACNC: 8 U/ML (ref 0–5)
TUNA IGE QN: <0.1 KU/L
WBC # BLD AUTO: 5.6 X10E3/UL (ref 3.4–10.8)
WHEAT IGE QN: <0.1 KU/L
WHITE BEAN IGE QN: <0.1 KU/L

## 2023-10-02 ENCOUNTER — TELEPHONE (OUTPATIENT)
Dept: FAMILY MEDICINE CLINIC | Facility: CLINIC | Age: 28
End: 2023-10-02
Payer: COMMERCIAL

## 2023-10-02 RX ORDER — ROPINIROLE 0.5 MG/1
TABLET, FILM COATED ORAL
Qty: 60 TABLET | Refills: 0 | Status: SHIPPED | OUTPATIENT
Start: 2023-10-02

## 2023-10-02 NOTE — TELEPHONE ENCOUNTER
Caller: MORENO GUTIERREZOME    Relationship: NOT ON  [employer]    Best call back number: [phone]    What specialty or service is being requested: gastroenterologist    What is the provider, practice or medical service name: [facility]     What is the office location: [place]    What is the office phone number: FAX# [phone]    Any additional details: PATIENT WOULD LIKE THE REFERRAL TO BE SENT TO THE LOCATION ABOVE.

## 2023-10-02 NOTE — TELEPHONE ENCOUNTER
Ok to stop mirapex  Will send in requip instead to use for his RLS.    His issues will take time to resolve, there is simply no way for me to give him something to stop his RLS right away.  The medicine has to build up in his system to work.    Xifaxan declined by insurance.  However, drug rep is coming to see us this week, will try to get enough free samples for him to use for his IBS

## 2023-10-02 NOTE — TELEPHONE ENCOUNTER
PATIENT WOULD LIKE FOR DR GONZALEZ TO GIVE HIM A CALL [phone]          I DID SPEAK WITH HIM ABOUT HIS GASTRO REFERRAL THAT IT WAS PUT IN ON 9/25 AND WAS SENT THAT DAY TO [facility]. REFERRAL IS CURRENTLY IN THEIR CLINICAL REVIEW. I PROVIDED LAKESHIA WITH THEIR PHONE NUMBER AS WELL INCASE HE DOESN'T HEAR FROM THEM TO SCHEDULE.

## 2023-10-02 NOTE — TELEPHONE ENCOUNTER
PA for Xifaxan denied.  Per insurance, Pt must meed ALL guidelines:  Diagnosis of IBS-D;  Age 18 years or older  Failure of an anti-diarrheal agent (e.g., loperamide) at up to maximally indicated doses, unless contraindicated or clinically significant adverse effects are experienced.  Failure of an antispasmodic (e.g., dicyclomine) at up to maximally indicated doses unless contraindicated or clinically significant adverse effects are experienced.  Doses does not exceed 1,650 mg (3 tablets) per day.

## 2023-10-03 ENCOUNTER — HOSPITAL ENCOUNTER (EMERGENCY)
Facility: HOSPITAL | Age: 28
Discharge: HOME | End: 2023-10-03
Attending: EMERGENCY MEDICINE | Admitting: EMERGENCY MEDICINE
Payer: COMMERCIAL

## 2023-10-03 ENCOUNTER — APPOINTMENT (OUTPATIENT)
Dept: CT IMAGING | Facility: HOSPITAL | Age: 28
End: 2023-10-03
Payer: COMMERCIAL

## 2023-10-03 VITALS
TEMPERATURE: 98.1 F | RESPIRATION RATE: 18 BRPM | HEIGHT: 72 IN | BODY MASS INDEX: 20.32 KG/M2 | WEIGHT: 150 LBS | SYSTOLIC BLOOD PRESSURE: 119 MMHG | OXYGEN SATURATION: 97 % | DIASTOLIC BLOOD PRESSURE: 74 MMHG | HEART RATE: 86 BPM

## 2023-10-03 DIAGNOSIS — R19.7 NAUSEA VOMITING AND DIARRHEA: ICD-10-CM

## 2023-10-03 DIAGNOSIS — G89.29 CHRONIC ABDOMINAL PAIN: Primary | ICD-10-CM

## 2023-10-03 DIAGNOSIS — R10.9 CHRONIC ABDOMINAL PAIN: Primary | ICD-10-CM

## 2023-10-03 DIAGNOSIS — R11.2 NAUSEA VOMITING AND DIARRHEA: ICD-10-CM

## 2023-10-03 LAB
ALBUMIN SERPL-MCNC: 4.8 G/DL (ref 3.5–5.2)
ALBUMIN/GLOB SERPL: 1.8 G/DL
ALP SERPL-CCNC: 102 U/L (ref 39–117)
ALT SERPL W P-5'-P-CCNC: 24 U/L (ref 1–41)
ANION GAP SERPL CALCULATED.3IONS-SCNC: 9 MMOL/L (ref 5–15)
AST SERPL-CCNC: 22 U/L (ref 1–40)
BASOPHILS # BLD AUTO: 0.03 10*3/MM3 (ref 0–0.2)
BASOPHILS NFR BLD AUTO: 0.4 % (ref 0–1.5)
BILIRUB SERPL-MCNC: 0.9 MG/DL (ref 0–1.2)
BILIRUB UR QL STRIP: NEGATIVE
BUN SERPL-MCNC: 8 MG/DL (ref 6–20)
BUN/CREAT SERPL: 9.8 (ref 7–25)
CALCIUM SPEC-SCNC: 9.3 MG/DL (ref 8.6–10.5)
CHLORIDE SERPL-SCNC: 102 MMOL/L (ref 98–107)
CLARITY UR: CLEAR
CO2 SERPL-SCNC: 28 MMOL/L (ref 22–29)
COLOR UR: YELLOW
CREAT BLDA-MCNC: 0.9 MG/DL
CREAT BLDA-MCNC: 0.9 MG/DL (ref 0.6–1.3)
CREAT SERPL-MCNC: 0.82 MG/DL (ref 0.76–1.27)
DEPRECATED RDW RBC AUTO: 41.8 FL (ref 37–54)
EGFRCR SERPLBLD CKD-EPI 2021: 124.2 ML/MIN/1.73
EOSINOPHIL # BLD AUTO: 0.02 10*3/MM3 (ref 0–0.4)
EOSINOPHIL NFR BLD AUTO: 0.3 % (ref 0.3–6.2)
ERYTHROCYTE [DISTWIDTH] IN BLOOD BY AUTOMATED COUNT: 12.1 % (ref 12.3–15.4)
GLOBULIN UR ELPH-MCNC: 2.6 GM/DL
GLUCOSE SERPL-MCNC: 98 MG/DL (ref 65–99)
GLUCOSE UR STRIP-MCNC: NEGATIVE MG/DL
HCT VFR BLD AUTO: 47.5 % (ref 37.5–51)
HGB BLD-MCNC: 16.1 G/DL (ref 13–17.7)
HGB UR QL STRIP.AUTO: NEGATIVE
HOLD SPECIMEN: NORMAL
IMM GRANULOCYTES # BLD AUTO: 0.01 10*3/MM3 (ref 0–0.05)
IMM GRANULOCYTES NFR BLD AUTO: 0.1 % (ref 0–0.5)
KETONES UR QL STRIP: NEGATIVE
LEUKOCYTE ESTERASE UR QL STRIP.AUTO: NEGATIVE
LIPASE SERPL-CCNC: 225 U/L (ref 13–60)
LYMPHOCYTES # BLD AUTO: 1.26 10*3/MM3 (ref 0.7–3.1)
LYMPHOCYTES NFR BLD AUTO: 18.8 % (ref 19.6–45.3)
MCH RBC QN AUTO: 31.7 PG (ref 26.6–33)
MCHC RBC AUTO-ENTMCNC: 33.9 G/DL (ref 31.5–35.7)
MCV RBC AUTO: 93.5 FL (ref 79–97)
MONOCYTES # BLD AUTO: 0.38 10*3/MM3 (ref 0.1–0.9)
MONOCYTES NFR BLD AUTO: 5.7 % (ref 5–12)
NEUTROPHILS NFR BLD AUTO: 5.01 10*3/MM3 (ref 1.7–7)
NEUTROPHILS NFR BLD AUTO: 74.7 % (ref 42.7–76)
NITRITE UR QL STRIP: NEGATIVE
NRBC BLD AUTO-RTO: 0 /100 WBC (ref 0–0.2)
PH UR STRIP.AUTO: 7.5 [PH] (ref 5–8)
PLATELET # BLD AUTO: 300 10*3/MM3 (ref 140–450)
PMV BLD AUTO: 10.8 FL (ref 6–12)
POTASSIUM SERPL-SCNC: 4 MMOL/L (ref 3.5–5.2)
PROT SERPL-MCNC: 7.4 G/DL (ref 6–8.5)
PROT UR QL STRIP: NEGATIVE
RBC # BLD AUTO: 5.08 10*6/MM3 (ref 4.14–5.8)
SODIUM SERPL-SCNC: 139 MMOL/L (ref 136–145)
SP GR UR STRIP: <=1.005 (ref 1–1.03)
UROBILINOGEN UR QL STRIP: NORMAL
WBC NRBC COR # BLD: 6.71 10*3/MM3 (ref 3.4–10.8)
WHOLE BLOOD HOLD COAG: NORMAL
WHOLE BLOOD HOLD SPECIMEN: NORMAL

## 2023-10-03 PROCEDURE — 85025 COMPLETE CBC W/AUTO DIFF WBC: CPT | Performed by: EMERGENCY MEDICINE

## 2023-10-03 PROCEDURE — 25010000002 KETOROLAC TROMETHAMINE PER 15 MG: Performed by: EMERGENCY MEDICINE

## 2023-10-03 PROCEDURE — 74177 CT ABD & PELVIS W/CONTRAST: CPT

## 2023-10-03 PROCEDURE — 25810000003 SODIUM CHLORIDE 0.9 % SOLUTION: Performed by: EMERGENCY MEDICINE

## 2023-10-03 PROCEDURE — 25010000002 ONDANSETRON PER 1 MG: Performed by: EMERGENCY MEDICINE

## 2023-10-03 PROCEDURE — 96375 TX/PRO/DX INJ NEW DRUG ADDON: CPT

## 2023-10-03 PROCEDURE — 25510000001 IOPAMIDOL 61 % SOLUTION: Performed by: EMERGENCY MEDICINE

## 2023-10-03 PROCEDURE — 81003 URINALYSIS AUTO W/O SCOPE: CPT | Performed by: EMERGENCY MEDICINE

## 2023-10-03 PROCEDURE — 96374 THER/PROPH/DIAG INJ IV PUSH: CPT

## 2023-10-03 PROCEDURE — 83690 ASSAY OF LIPASE: CPT | Performed by: EMERGENCY MEDICINE

## 2023-10-03 PROCEDURE — 80053 COMPREHEN METABOLIC PANEL: CPT | Performed by: EMERGENCY MEDICINE

## 2023-10-03 PROCEDURE — 99285 EMERGENCY DEPT VISIT HI MDM: CPT

## 2023-10-03 PROCEDURE — 82565 ASSAY OF CREATININE: CPT

## 2023-10-03 RX ORDER — KETOROLAC TROMETHAMINE 15 MG/ML
15 INJECTION, SOLUTION INTRAMUSCULAR; INTRAVENOUS ONCE
Status: COMPLETED | OUTPATIENT
Start: 2023-10-03 | End: 2023-10-03

## 2023-10-03 RX ORDER — SODIUM CHLORIDE 9 MG/ML
10 INJECTION INTRAVENOUS AS NEEDED
Status: DISCONTINUED | OUTPATIENT
Start: 2023-10-03 | End: 2023-10-03 | Stop reason: HOSPADM

## 2023-10-03 RX ORDER — DICYCLOMINE HCL 20 MG
20 TABLET ORAL EVERY 8 HOURS PRN
Qty: 20 TABLET | Refills: 0 | Status: SHIPPED | OUTPATIENT
Start: 2023-10-03

## 2023-10-03 RX ORDER — DICYCLOMINE HYDROCHLORIDE 10 MG/1
20 CAPSULE ORAL ONCE
Status: COMPLETED | OUTPATIENT
Start: 2023-10-03 | End: 2023-10-03

## 2023-10-03 RX ORDER — ONDANSETRON 2 MG/ML
4 INJECTION INTRAMUSCULAR; INTRAVENOUS ONCE
Status: COMPLETED | OUTPATIENT
Start: 2023-10-03 | End: 2023-10-03

## 2023-10-03 RX ORDER — ONDANSETRON 4 MG/1
4 TABLET, FILM COATED ORAL EVERY 8 HOURS PRN
Qty: 15 TABLET | Refills: 0 | Status: SHIPPED | OUTPATIENT
Start: 2023-10-03

## 2023-10-03 RX ADMIN — IOPAMIDOL 85 ML: 612 INJECTION, SOLUTION INTRAVENOUS at 14:25

## 2023-10-03 RX ADMIN — ONDANSETRON 4 MG: 2 INJECTION INTRAMUSCULAR; INTRAVENOUS at 14:39

## 2023-10-03 RX ADMIN — SODIUM CHLORIDE 1000 ML: 9 INJECTION, SOLUTION INTRAVENOUS at 14:37

## 2023-10-03 RX ADMIN — DICYCLOMINE HYDROCHLORIDE 20 MG: 10 CAPSULE ORAL at 14:41

## 2023-10-03 RX ADMIN — KETOROLAC TROMETHAMINE 15 MG: 15 INJECTION, SOLUTION INTRAMUSCULAR; INTRAVENOUS at 14:40

## 2023-10-03 NOTE — TELEPHONE ENCOUNTER
Patient informed and verbalized understanding. Pt wanting to know if there was an alternative that he could try for IBS that insurance would cover. Pt was advised to call his insurance to inquire what mediations they will cover.

## 2023-10-03 NOTE — ED PROVIDER NOTES
Subjective   History of Present Illness  27-year-old male presents for evaluation of abdominal pain as well as nausea, vomiting, and diarrhea.  The patient tells me that he has been experiencing GI symptoms chronically now for about 5 years.  He previously saw a gastroenterologist but was dissatisfied with the care that he received.  He just recently got medical insurance but is unfortunately unable to afford any of the medications prescribed by his primary care physician for IBS.  Over the past several weeks, he notes that he has been experiencing worsening abdominal pain accompanied by nausea, vomiting, and diarrhea intermittently.  No fevers.  No bloody stools.  He currently rates his pain at 6 out of 10 in severity.  Given his persistent symptoms, he came here to the emergency department to be evaluated.  He denies any known dietary triggers for his symptoms.  He states that he had labs drawn this past week, including a celiac panel that he reports was positive.    Review of Systems   Gastrointestinal:  Positive for abdominal pain, diarrhea, nausea and vomiting.   All other systems reviewed and are negative.    No past medical history on file.    No Known Allergies    No past surgical history on file.    Family History   Problem Relation Age of Onset    Thyroid disease Mother     Anxiety disorder Mother     Arthritis Mother     Arthritis Father     Cancer Paternal Grandmother         breast    Diabetes Paternal Grandmother        Social History     Socioeconomic History    Marital status: Single   Tobacco Use    Smoking status: Former     Packs/day: 0.50     Years: 8.00     Pack years: 4.00     Types: Cigarettes     Quit date: 2020     Years since quitting: 3.7    Smokeless tobacco: Never   Vaping Use    Vaping Use: Every day    Substances: Nicotine           Objective   Physical Exam  Vitals and nursing note reviewed.   Constitutional:       General: He is not in acute distress.     Appearance: He is  well-developed. He is not diaphoretic.      Comments: Nontoxic-appearing male   HENT:      Head: Normocephalic and atraumatic.   Neck:      Vascular: No JVD.   Cardiovascular:      Rate and Rhythm: Normal rate and regular rhythm.      Heart sounds: Normal heart sounds. No murmur heard.    No friction rub. No gallop.   Pulmonary:      Effort: Pulmonary effort is normal. No respiratory distress.      Breath sounds: Normal breath sounds. No wheezing or rales.   Abdominal:      General: Bowel sounds are normal. There is no distension.      Palpations: Abdomen is soft. There is no mass.      Tenderness: There is abdominal tenderness. There is guarding.      Comments: Generalized abdominal tenderness noted with guarding present, no pain out of proportion to exam   Genitourinary:     Comments: No CVA tenderness noted  Musculoskeletal:         General: Normal range of motion.      Cervical back: Normal range of motion.   Skin:     General: Skin is warm and dry.      Coloration: Skin is not pale.      Findings: No erythema or rash.      Comments: No dermatomal rash noted   Neurological:      Mental Status: He is alert and oriented to person, place, and time.      Comments: Normal gait   Psychiatric:         Thought Content: Thought content normal.         Judgment: Judgment normal.       Procedures           ED Course  ED Course as of 10/03/23 1840   Tue Oct 03, 2023   1318 27-year-old male presents for evaluation of abdominal pain as well as nausea, vomiting, and diarrhea.  The patient tells me that he has been experiencing GI symptoms chronically now for about 5 years.  He previously saw a gastroenterologist but was dissatisfied with the care that he received.  He just recently got medical insurance and is unable to afford any of the medications for IBS prescribed by his primary care physician.  He notes worsening symptoms over the past several weeks, prompting his visit to the emergency department today.  On arrival, the  patient is nontoxic-appearing.  Exam remarkable for generalized abdominal tenderness with guarding noted.  No pain out of proportion to exam.  No CVA tenderness noted.  Broad differential diagnosis.  We will obtain labs and imaging, and we will reassess following initial interventions. [DD]   1428 Lipase is elevated at 225.  When I trended out the patient's prior labs, his lipase is chronically elevated.  Clinical presentation does not appear consistent with pancreatitis. [DD]   1434 I personally and independently reviewed the patient's CT images and findings, and I am in agreement with the radiologist regarding CT interpretation--particularly, there is no emergent/surgical intra-abdominal process present. [DD]   1437 Upon reevaluation, the patient looks and feels improved.  He is tolerating p.o.  Prescription for Bentyl and Zofran as needed.  Given the chronic nature of his symptoms, I have referred the patient to gastroenterology.  He will follow-up with Dr. Brunner within the next week.  Agreeable with plan and given appropriate strict return precautions. [DD]      ED Course User Index  [DD] Ilir Russo MD                                      Recent Results (from the past 24 hour(s))   Comprehensive Metabolic Panel    Collection Time: 10/03/23  1:46 PM    Specimen: Blood   Result Value Ref Range    Glucose 98 65 - 99 mg/dL    BUN 8 6 - 20 mg/dL    Creatinine 0.82 0.76 - 1.27 mg/dL    Sodium 139 136 - 145 mmol/L    Potassium 4.0 3.5 - 5.2 mmol/L    Chloride 102 98 - 107 mmol/L    CO2 28.0 22.0 - 29.0 mmol/L    Calcium 9.3 8.6 - 10.5 mg/dL    Total Protein 7.4 6.0 - 8.5 g/dL    Albumin 4.8 3.5 - 5.2 g/dL    ALT (SGPT) 24 1 - 41 U/L    AST (SGOT) 22 1 - 40 U/L    Alkaline Phosphatase 102 39 - 117 U/L    Total Bilirubin 0.9 0.0 - 1.2 mg/dL    Globulin 2.6 gm/dL    A/G Ratio 1.8 g/dL    BUN/Creatinine Ratio 9.8 7.0 - 25.0    Anion Gap 9.0 5.0 - 15.0 mmol/L    eGFR 124.2 >60.0 mL/min/1.73   Lipase     Collection Time: 10/03/23  1:46 PM    Specimen: Blood   Result Value Ref Range    Lipase 225 (H) 13 - 60 U/L   Green Top (Gel)    Collection Time: 10/03/23  1:46 PM   Result Value Ref Range    Extra Tube Hold for add-ons.    Lavender Top    Collection Time: 10/03/23  1:46 PM   Result Value Ref Range    Extra Tube hold for add-on    Gold Top - SST    Collection Time: 10/03/23  1:46 PM   Result Value Ref Range    Extra Tube Hold for add-ons.    Gray Top    Collection Time: 10/03/23  1:46 PM   Result Value Ref Range    Extra Tube Hold for add-ons.    Light Blue Top    Collection Time: 10/03/23  1:46 PM   Result Value Ref Range    Extra Tube Hold for add-ons.    CBC Auto Differential    Collection Time: 10/03/23  1:46 PM    Specimen: Blood   Result Value Ref Range    WBC 6.71 3.40 - 10.80 10*3/mm3    RBC 5.08 4.14 - 5.80 10*6/mm3    Hemoglobin 16.1 13.0 - 17.7 g/dL    Hematocrit 47.5 37.5 - 51.0 %    MCV 93.5 79.0 - 97.0 fL    MCH 31.7 26.6 - 33.0 pg    MCHC 33.9 31.5 - 35.7 g/dL    RDW 12.1 (L) 12.3 - 15.4 %    RDW-SD 41.8 37.0 - 54.0 fl    MPV 10.8 6.0 - 12.0 fL    Platelets 300 140 - 450 10*3/mm3    Neutrophil % 74.7 42.7 - 76.0 %    Lymphocyte % 18.8 (L) 19.6 - 45.3 %    Monocyte % 5.7 5.0 - 12.0 %    Eosinophil % 0.3 0.3 - 6.2 %    Basophil % 0.4 0.0 - 1.5 %    Immature Grans % 0.1 0.0 - 0.5 %    Neutrophils, Absolute 5.01 1.70 - 7.00 10*3/mm3    Lymphocytes, Absolute 1.26 0.70 - 3.10 10*3/mm3    Monocytes, Absolute 0.38 0.10 - 0.90 10*3/mm3    Eosinophils, Absolute 0.02 0.00 - 0.40 10*3/mm3    Basophils, Absolute 0.03 0.00 - 0.20 10*3/mm3    Immature Grans, Absolute 0.01 0.00 - 0.05 10*3/mm3    nRBC 0.0 0.0 - 0.2 /100 WBC   POC Creatinine    Collection Time: 10/03/23  1:55 PM    Specimen: Blood   Result Value Ref Range    Creatinine 0.90 0.60 - 1.30 mg/dL   POC Creatinine    Collection Time: 10/03/23  1:56 PM    Specimen: Blood   Result Value Ref Range    Creatinine 0.90 mg/dL   Urinalysis With Microscopic If  "Indicated (No Culture) - Urine, Clean Catch    Collection Time: 10/03/23  2:16 PM    Specimen: Urine, Clean Catch   Result Value Ref Range    Color, UA Yellow Yellow, Straw    Appearance, UA Clear Clear    pH, UA 7.5 5.0 - 8.0    Specific Gravity, UA <=1.005 1.001 - 1.030    Glucose, UA Negative Negative    Ketones, UA Negative Negative    Bilirubin, UA Negative Negative    Blood, UA Negative Negative    Protein, UA Negative Negative    Leuk Esterase, UA Negative Negative    Nitrite, UA Negative Negative    Urobilinogen, UA 0.2 E.U./dL 0.2 - 1.0 E.U./dL     Note: In addition to lab results from this visit, the labs listed above may include labs taken at another facility or during a different encounter within the last 24 hours. Please correlate lab times with ED admission and discharge times for further clarification of the services performed during this visit.    CT Abdomen Pelvis With Contrast   Final Result   Impression:   No acute process identified.            Electronically Signed: Maciej Francisco MD     10/3/2023 1:30 PM CDT     Workstation ID: [id number]        Vitals:    10/03/23 1302   BP: 119/74   BP Location: Left arm   Patient Position: Sitting   Pulse: 86   Resp: 18   Temp: 98.1 °F (36.7 °C)   TempSrc: Oral   SpO2: 97%   Weight: 68 kg (150 lb)   Height: 182.9 cm (72\")     Medications   sodium chloride 0.9 % bolus 1,000 mL (0 mL Intravenous Stopped 10/3/23 1455)   ondansetron (ZOFRAN) injection 4 mg (4 mg Intravenous Given 10/3/23 1439)   ketorolac (TORADOL) injection 15 mg (15 mg Intravenous Given 10/3/23 1440)   dicyclomine (BENTYL) capsule 20 mg (20 mg Oral Given 10/3/23 1441)   iopamidol (ISOVUE-300) 61 % injection 100 mL (85 mL Intravenous Given 10/3/23 1425)     ECG/EMG Results (last 24 hours)       ** No results found for the last 24 hours. **          No orders to display              Medical Decision Making  Problems Addressed:  Chronic abdominal pain: complicated acute illness or injury  Nausea " vomiting and diarrhea: complicated acute illness or injury    Amount and/or Complexity of Data Reviewed  Labs: ordered.  Radiology: ordered.    Risk  Prescription drug management.        Final diagnoses:   Chronic abdominal pain   Nausea vomiting and diarrhea       ED Disposition  ED Disposition       ED Disposition   Discharge    Condition   Stable    Comment   --               Brunner, Mark I, MD  [address] [id number]  [phone]    In 1 week           Medication List        New Prescriptions      dicyclomine 20 MG tablet  Commonly known as: BENTYL  Take 1 tablet by mouth Every 8 (Eight) Hours As Needed for Abdominal Cramping.     ondansetron 4 MG tablet  Commonly known as: ZOFRAN  Take 1 tablet by mouth Every 8 (Eight) Hours As Needed for Nausea.               Where to Get Your Medications        These medications were sent to [facility] [id number] - [place], [place] - [address] - [phone]  - [phone]  [address] [address]      Phone: [phone]   dicyclomine 20 MG tablet  ondansetron 4 MG tablet            Ilir Russo MD  10/03/23 7803

## 2023-10-14 ENCOUNTER — PATIENT MESSAGE (OUTPATIENT)
Dept: FAMILY MEDICINE CLINIC | Facility: CLINIC | Age: 28
End: 2023-10-14
Payer: COMMERCIAL

## 2023-10-14 DIAGNOSIS — F41.1 GENERALIZED ANXIETY DISORDER: ICD-10-CM

## 2023-10-14 DIAGNOSIS — R45.4 IRRITABILITY: ICD-10-CM

## 2023-10-14 RX ORDER — ESCITALOPRAM OXALATE 10 MG/1
10 TABLET ORAL DAILY
Qty: 30 TABLET | Refills: 1 | Status: CANCELLED | OUTPATIENT
Start: 2023-10-14

## 2023-10-17 ENCOUNTER — TELEPHONE (OUTPATIENT)
Dept: FAMILY MEDICINE CLINIC | Facility: CLINIC | Age: 28
End: 2023-10-17
Payer: COMMERCIAL

## 2023-10-17 DIAGNOSIS — K58.2 IRRITABLE BOWEL SYNDROME WITH BOTH CONSTIPATION AND DIARRHEA: Primary | ICD-10-CM

## 2023-10-17 NOTE — TELEPHONE ENCOUNTER
----- Message from Blas Alcazar sent at 10/14/2023 12:57 PM EDT -----  Regarding: REFILLS  Contact: [phone]  I went to the ER at [facility] on October 3rd. Dr Russo prescribed me Zofran and Dicyclomine but it was only a 5 day supply. These medicines are helping more than anything else I have tried. Is there any way to get a refill on them?

## 2023-10-19 ENCOUNTER — OFFICE VISIT (OUTPATIENT)
Dept: GASTROENTEROLOGY | Facility: CLINIC | Age: 28
End: 2023-10-19
Payer: COMMERCIAL

## 2023-10-19 ENCOUNTER — LAB (OUTPATIENT)
Dept: LAB | Facility: HOSPITAL | Age: 28
End: 2023-10-19
Payer: COMMERCIAL

## 2023-10-19 VITALS
BODY MASS INDEX: 20.89 KG/M2 | TEMPERATURE: 96.9 F | SYSTOLIC BLOOD PRESSURE: 118 MMHG | WEIGHT: 154 LBS | HEART RATE: 76 BPM | DIASTOLIC BLOOD PRESSURE: 74 MMHG | OXYGEN SATURATION: 98 %

## 2023-10-19 DIAGNOSIS — R10.84 GENERALIZED ABDOMINAL PAIN: ICD-10-CM

## 2023-10-19 DIAGNOSIS — R68.81 EARLY SATIETY: ICD-10-CM

## 2023-10-19 DIAGNOSIS — R11.0 NAUSEA: ICD-10-CM

## 2023-10-19 DIAGNOSIS — K58.0 IRRITABLE BOWEL SYNDROME WITH DIARRHEA: Primary | ICD-10-CM

## 2023-10-19 DIAGNOSIS — K58.0 IRRITABLE BOWEL SYNDROME WITH DIARRHEA: ICD-10-CM

## 2023-10-19 LAB
25(OH)D3 SERPL-MCNC: 26.8 NG/ML (ref 30–100)
ALBUMIN SERPL-MCNC: 4.6 G/DL (ref 3.5–5.2)
ALBUMIN/GLOB SERPL: 1.9 G/DL
ALP SERPL-CCNC: 118 U/L (ref 39–117)
ALT SERPL W P-5'-P-CCNC: 30 U/L (ref 1–41)
AMYLASE SERPL-CCNC: 267 U/L (ref 28–100)
ANION GAP SERPL CALCULATED.3IONS-SCNC: 9.4 MMOL/L (ref 5–15)
AST SERPL-CCNC: 28 U/L (ref 1–40)
BILIRUB SERPL-MCNC: 0.3 MG/DL (ref 0–1.2)
BUN SERPL-MCNC: 10 MG/DL (ref 6–20)
BUN/CREAT SERPL: 10.1 (ref 7–25)
CALCIUM SPEC-SCNC: 9.1 MG/DL (ref 8.6–10.5)
CHLORIDE SERPL-SCNC: 102 MMOL/L (ref 98–107)
CO2 SERPL-SCNC: 24.6 MMOL/L (ref 22–29)
CREAT SERPL-MCNC: 0.99 MG/DL (ref 0.76–1.27)
CRP SERPL-MCNC: 0.53 MG/DL (ref 0–0.5)
EGFRCR SERPLBLD CKD-EPI 2021: 107.1 ML/MIN/1.73
GGT SERPL-CCNC: 22 U/L (ref 8–61)
GLOBULIN UR ELPH-MCNC: 2.4 GM/DL
GLUCOSE SERPL-MCNC: 92 MG/DL (ref 65–99)
LIPASE SERPL-CCNC: 474 U/L (ref 13–60)
MAGNESIUM SERPL-MCNC: 2.6 MG/DL (ref 1.6–2.6)
POTASSIUM SERPL-SCNC: 4.2 MMOL/L (ref 3.5–5.2)
PROT SERPL-MCNC: 7 G/DL (ref 6–8.5)
SODIUM SERPL-SCNC: 136 MMOL/L (ref 136–145)
TSH SERPL DL<=0.05 MIU/L-ACNC: 2.09 UIU/ML (ref 0.27–4.2)
VIT B12 BLD-MCNC: 618 PG/ML (ref 211–946)

## 2023-10-19 PROCEDURE — 83690 ASSAY OF LIPASE: CPT | Performed by: PHYSICIAN ASSISTANT

## 2023-10-19 PROCEDURE — 82607 VITAMIN B-12: CPT | Performed by: PHYSICIAN ASSISTANT

## 2023-10-19 PROCEDURE — 86008 ALLG SPEC IGE RECOMB EA: CPT

## 2023-10-19 PROCEDURE — 86140 C-REACTIVE PROTEIN: CPT

## 2023-10-19 PROCEDURE — 86003 ALLG SPEC IGE CRUDE XTRC EA: CPT

## 2023-10-19 PROCEDURE — 86682 HELMINTH ANTIBODY: CPT | Performed by: PHYSICIAN ASSISTANT

## 2023-10-19 PROCEDURE — 85025 COMPLETE CBC W/AUTO DIFF WBC: CPT

## 2023-10-19 PROCEDURE — 82785 ASSAY OF IGE: CPT

## 2023-10-19 PROCEDURE — 84443 ASSAY THYROID STIM HORMONE: CPT | Performed by: PHYSICIAN ASSISTANT

## 2023-10-19 PROCEDURE — 83735 ASSAY OF MAGNESIUM: CPT | Performed by: PHYSICIAN ASSISTANT

## 2023-10-19 PROCEDURE — 82977 ASSAY OF GGT: CPT | Performed by: PHYSICIAN ASSISTANT

## 2023-10-19 PROCEDURE — 82306 VITAMIN D 25 HYDROXY: CPT | Performed by: PHYSICIAN ASSISTANT

## 2023-10-19 PROCEDURE — 82150 ASSAY OF AMYLASE: CPT | Performed by: PHYSICIAN ASSISTANT

## 2023-10-19 PROCEDURE — 36415 COLL VENOUS BLD VENIPUNCTURE: CPT | Performed by: PHYSICIAN ASSISTANT

## 2023-10-19 PROCEDURE — 80053 COMPREHEN METABOLIC PANEL: CPT | Performed by: PHYSICIAN ASSISTANT

## 2023-10-19 RX ORDER — DICYCLOMINE HCL 20 MG
20 TABLET ORAL EVERY 8 HOURS PRN
Qty: 120 TABLET | Refills: 2 | Status: SHIPPED | OUTPATIENT
Start: 2023-10-19 | End: 2023-10-20

## 2023-10-19 RX ORDER — ONDANSETRON 4 MG/1
4 TABLET, FILM COATED ORAL EVERY 8 HOURS PRN
Qty: 30 TABLET | Refills: 2 | Status: SHIPPED | OUTPATIENT
Start: 2023-10-19 | End: 2023-10-20

## 2023-10-19 NOTE — PROGRESS NOTES
New Patient Consultation     Patient Name: Blas Alcazar  : 1995   MRN: [id number]     Chief Complaint:  No chief complaint on file.      History of Present Illness: Blas Alcazar is a 28 y.o. male who is here today for a Gastroenterology Consultation for abdominal pain. Mother and girlfriend are with patient for visit today.     Pt has previously been evaluated by Dr. Jean, GI in [place], underwent EGD / CSY that were apparently unrevealing, data deficient at time of exam. Labs via PCP recently reveals amylase 133 / lipase 111 (chronically elevated). ESE, ESR, TSH, cortisol, celiac panel essentially normal. Celiac panel essentially normal. Xifaxan was prescribed by PCP but pt could not afford such. Pt has previously not had response to PPI, carafate. He has noticed temporary improvement of sx with use of bentyl, zofran a few times per day. Previous testing for Lyme Disease () was negative.     Pt describes generalized abdominal pain, worse in epigastric and bilateral lower quadrants that is constant and more intense in the morning / evening or when applying pressure to the abdomen. He has been following bland diet long term. He identifies [employer] (all foods) to be particularly aggravating. He reports associated nausea, early satiety. Mother notes that GI sx started after what they suspect was a COVID-19 infection (diagnosed late ).     He generally has 5-10 loose, urgent stools per day. He denies blood or mucous in his stool. Previous stool studies apparently normal ().     No previous GES, HIDA scan.     Pt was recently started on Lexapro by PCP for anxiety, anger / irritability. He denies changes in GI sx with starting such. Food allergy panel recently revealed elevated markers for beef, and pt has been avoiding gluten / beef x 1 month.     Pt was evaluated at [facility] ED 10/3 for evaluation of chronic abdominal pain, nausea, vomiting, diarrhea. CT A/P w/ contrast performed at that time,  no acute findings. CBC, CMP unrevealing. Lipase 225.     Patient denies personal or FHx of PUD, H Pylori, pancreatitis, colitis, Celiac disease, UC, Crohn's disease, colon or gastric cancers. Pt denies EtOH, tobacco, illicit substance or NSAID use. Ecigarette usage daily.     Subjective      Review of Systems:   Review of Systems   Constitutional:  Positive for appetite change (early satiety). Negative for chills, diaphoresis, fatigue, fever, unexpected weight gain and unexpected weight loss.   HENT:  Negative for drooling, facial swelling, mouth sores, nosebleeds, rhinorrhea, sore throat, swollen glands, tinnitus and trouble swallowing.    Eyes: Negative.    Respiratory:  Negative for choking and chest tightness.    Gastrointestinal:  Positive for abdominal pain, diarrhea and nausea. Negative for anal bleeding, blood in stool, constipation, vomiting, GERD and indigestion.   Endocrine: Negative.    Genitourinary:  Negative for dysuria, flank pain and hematuria.   Musculoskeletal:  Negative for arthralgias, back pain, myalgias and neck pain.   Skin:  Negative for color change, dry skin, pallor and bruise.   Neurological:  Negative for dizziness, tremors, syncope, weakness and numbness.   Psychiatric/Behavioral:  Positive for agitation and depressed mood. Negative for decreased concentration, hallucinations and self-injury. The patient is nervous/anxious.    All other systems reviewed and are negative.      Past Medical History: No past medical history on file.    Past Surgical History: No past surgical history on file.    Family History:   Family History   Problem Relation Age of Onset    Thyroid disease Mother     Anxiety disorder Mother     Arthritis Mother     Arthritis Father     Cancer Paternal Grandmother         breast    Diabetes Paternal Grandmother        Social History:   Social History     Socioeconomic History    Marital status: Single   Tobacco Use    Smoking status: Former     Packs/day: 0.50      Years: 8.00     Additional pack years: 0.00     Total pack years: 4.00     Types: Cigarettes     Quit date: 2020     Years since quitting: 3.8    Smokeless tobacco: Never   Vaping Use    Vaping Use: Every day    Substances: Nicotine       Alcohol/Tobacco History:   Social History     Substance and Sexual Activity   Alcohol Use None     Social History     Tobacco Use   Smoking Status Former    Packs/day: 0.50    Years: 8.00    Additional pack years: 0.00    Total pack years: 4.00    Types: Cigarettes    Quit date: 2020    Years since quitting: 3.8   Smokeless Tobacco Never       Medications:     Current Outpatient Medications:     dicyclomine (BENTYL) 20 MG tablet, Take 1 tablet by mouth Every 8 (Eight) Hours As Needed for Abdominal Cramping., Disp: 20 tablet, Rfl: 0    escitalopram (Lexapro) 10 MG tablet, Take 1 tablet by mouth Daily., Disp: 30 tablet, Rfl: 1    ondansetron (ZOFRAN) 4 MG tablet, Take 1 tablet by mouth Every 8 (Eight) Hours As Needed for Nausea., Disp: 15 tablet, Rfl: 0    riFAXIMin (Xifaxan) 550 MG tablet, Take 1 tablet by mouth 3 (Three) Times a Day., Disp: 42 tablet, Rfl: 0    rOPINIRole (REQUIP) 0.5 MG tablet, 1 PO QHS 4 nights then 2 PO QHS Take 1 hour before bedtime., Disp: 60 tablet, Rfl: 0    Allergies:   No Known Allergies    Objective     Physical Exam:  Vital Signs: There were no vitals filed for this visit.  There is no height or weight on file to calculate BMI.     Physical Exam  Vitals and nursing note reviewed.   Constitutional:       General: He is not in acute distress.     Appearance: Normal appearance. He is not ill-appearing or diaphoretic.   HENT:      Head: Normocephalic and atraumatic.      Right Ear: External ear normal.      Left Ear: External ear normal.      Nose: Nose normal.      Mouth/Throat:      Mouth: Mucous membranes are moist.      Pharynx: Oropharynx is clear.   Eyes:      Conjunctiva/sclera: Conjunctivae normal.      Pupils: Pupils are equal, round, and reactive  to light.   Cardiovascular:      Rate and Rhythm: Normal rate and regular rhythm.      Pulses: Normal pulses.      Heart sounds: Normal heart sounds.   Pulmonary:      Effort: Pulmonary effort is normal.      Breath sounds: Normal breath sounds.   Abdominal:      General: Abdomen is flat. There is no distension.      Tenderness: There is abdominal tenderness (epigastric, bilateral lower quadrants). There is no guarding or rebound.   Musculoskeletal:         General: Normal range of motion.      Cervical back: Normal range of motion.   Skin:     General: Skin is warm and dry.      Coloration: Skin is not jaundiced or pale.   Neurological:      General: No focal deficit present.      Mental Status: He is alert and oriented to person, place, and time. Mental status is at baseline.   Psychiatric:         Mood and Affect: Mood is depressed. Affect is flat.         Assessment / Plan      Assessment/Plan:   There are no diagnoses linked to this encounter.     IBS-D  Generalized abdominal pain  Nausea  Early satiety   - refills of bentyl, zofran sent to pharmacy   - rx for Xifaxan 550mg TID x 14 days sent to pharmacy   - pt given instructions to continue gluten-free, beef-free diet   - obtain CBC, CMP, CRP, lipase, amylase, GGT, alpha gal panel, Mg, TSH, vit B12, vit D, stool panel, C difficile, ova + parasites, fecal calprotectin, fecal elastase, Strongyloides Ab   - obtain 4h GES   - obtain HIDA scan   - schedule for EGD / CSY   - obtain previous endoscopy and pathology reports from previous GI provider   - follow up in clinic after completion of above studies   - call clinic at any time for questions or new / worsened sx    Follow Up:   Return in about 6 weeks (around 11/30/2023).    Plan of care reviewed with the patient at the conclusion of today's visit.  Education was provided regarding diagnosis, management, and any prescribed or recommended OTC medications.  Patient verbalized understanding of and agreement with  management plan.     NOTE TO PATIENT: The 21st Century Cures Act makes medical notes like these available to patients in the interest of transparency. However, be advised this is a medical document. It is intended as peer to peer communication. It is written in medical language and may contain abbreviations or verbiage that are unfamiliar. It may appear blunt or direct. Medical documents are intended to carry relevant information, facts as evident, and the clinical opinion of the practitioner.     Time Statement:   Discussed plan of care in detail with patient today. Patient verbally understands and agrees. I have spent 45 minutes reviewing available diagnostics, obtaining history, examining the patient, developing a treatment plan, and educating the patient on disease process and plan of care.    Willow Larkin PA-C   [facility]

## 2023-10-20 DIAGNOSIS — R74.8 ELEVATED AMYLASE: ICD-10-CM

## 2023-10-20 DIAGNOSIS — R10.84 GENERALIZED ABDOMINAL PAIN: Primary | ICD-10-CM

## 2023-10-20 DIAGNOSIS — R11.0 NAUSEA: ICD-10-CM

## 2023-10-20 DIAGNOSIS — R74.8 ELEVATED LIPASE: ICD-10-CM

## 2023-10-20 LAB
BASOPHILS # BLD AUTO: 0.02 10*3/MM3 (ref 0–0.2)
BASOPHILS NFR BLD AUTO: 0.3 % (ref 0–1.5)
DEPRECATED RDW RBC AUTO: 40.2 FL (ref 37–54)
EOSINOPHIL # BLD AUTO: 0.06 10*3/MM3 (ref 0–0.4)
EOSINOPHIL NFR BLD AUTO: 1 % (ref 0.3–6.2)
ERYTHROCYTE [DISTWIDTH] IN BLOOD BY AUTOMATED COUNT: 12.4 % (ref 12.3–15.4)
HCT VFR BLD AUTO: 43.8 % (ref 37.5–51)
HGB BLD-MCNC: 15.2 G/DL (ref 13–17.7)
IMM GRANULOCYTES # BLD AUTO: 0.02 10*3/MM3 (ref 0–0.05)
IMM GRANULOCYTES NFR BLD AUTO: 0.3 % (ref 0–0.5)
LYMPHOCYTES # BLD AUTO: 1.2 10*3/MM3 (ref 0.7–3.1)
LYMPHOCYTES NFR BLD AUTO: 19.9 % (ref 19.6–45.3)
MCH RBC QN AUTO: 31.5 PG (ref 26.6–33)
MCHC RBC AUTO-ENTMCNC: 34.7 G/DL (ref 31.5–35.7)
MCV RBC AUTO: 90.7 FL (ref 79–97)
MONOCYTES # BLD AUTO: 0.43 10*3/MM3 (ref 0.1–0.9)
MONOCYTES NFR BLD AUTO: 7.1 % (ref 5–12)
NEUTROPHILS NFR BLD AUTO: 4.3 10*3/MM3 (ref 1.7–7)
NEUTROPHILS NFR BLD AUTO: 71.4 % (ref 42.7–76)
NRBC BLD AUTO-RTO: 0 /100 WBC (ref 0–0.2)
PLATELET # BLD AUTO: 262 10*3/MM3 (ref 140–450)
PMV BLD AUTO: 11.9 FL (ref 6–12)
RBC # BLD AUTO: 4.83 10*6/MM3 (ref 4.14–5.8)
WBC NRBC COR # BLD: 6.03 10*3/MM3 (ref 3.4–10.8)

## 2023-10-20 RX ORDER — DICYCLOMINE HCL 20 MG
20 TABLET ORAL EVERY 6 HOURS
Qty: 90 TABLET | Refills: 3 | Status: SHIPPED | OUTPATIENT
Start: 2023-10-20 | End: 2023-10-25 | Stop reason: SDUPTHER

## 2023-10-20 RX ORDER — ONDANSETRON 8 MG/1
8 TABLET, ORALLY DISINTEGRATING ORAL EVERY 8 HOURS PRN
Qty: 20 TABLET | Refills: 1 | Status: SHIPPED | OUTPATIENT
Start: 2023-10-20 | End: 2023-10-25 | Stop reason: DRUGHIGH

## 2023-10-24 LAB
ALPHA-GAL IGE QN: 0.31 KU/L
BEEF IGE QN: <0.1 KU/L
CONV CLASS DESCRIPTION: ABNORMAL
IGE SERPL-ACNC: 11 IU/ML (ref 6–495)
LAMB IGE QN: <0.1 KU/L
PORK IGE QN: <0.1 KU/L
STRONGYLOIDES IGG SER QL IA: NEGATIVE

## 2023-10-25 ENCOUNTER — OFFICE VISIT (OUTPATIENT)
Dept: FAMILY MEDICINE CLINIC | Facility: CLINIC | Age: 28
End: 2023-10-25
Payer: COMMERCIAL

## 2023-10-25 VITALS
WEIGHT: 154 LBS | BODY MASS INDEX: 20.86 KG/M2 | RESPIRATION RATE: 18 BRPM | TEMPERATURE: 97.8 F | OXYGEN SATURATION: 97 % | HEIGHT: 72 IN | HEART RATE: 90 BPM | DIASTOLIC BLOOD PRESSURE: 68 MMHG | SYSTOLIC BLOOD PRESSURE: 120 MMHG

## 2023-10-25 DIAGNOSIS — R11.2 NAUSEA AND VOMITING, UNSPECIFIED VOMITING TYPE: ICD-10-CM

## 2023-10-25 DIAGNOSIS — F41.1 GENERALIZED ANXIETY DISORDER: Primary | ICD-10-CM

## 2023-10-25 DIAGNOSIS — R45.4 IRRITABILITY: ICD-10-CM

## 2023-10-25 DIAGNOSIS — K58.2 IRRITABLE BOWEL SYNDROME WITH BOTH CONSTIPATION AND DIARRHEA: ICD-10-CM

## 2023-10-25 DIAGNOSIS — R74.8 SERUM AMYLASE ELEVATED: ICD-10-CM

## 2023-10-25 DIAGNOSIS — R74.8 ELEVATED LIPASE: ICD-10-CM

## 2023-10-25 PROCEDURE — 99214 OFFICE O/P EST MOD 30 MIN: CPT | Performed by: FAMILY MEDICINE

## 2023-10-25 RX ORDER — ESCITALOPRAM OXALATE 20 MG/1
20 TABLET ORAL DAILY
Qty: 30 TABLET | Refills: 2 | Status: SHIPPED | OUTPATIENT
Start: 2023-10-25

## 2023-10-25 RX ORDER — ONDANSETRON 4 MG/1
4 TABLET, ORALLY DISINTEGRATING ORAL EVERY 8 HOURS PRN
Qty: 30 TABLET | Refills: 2 | Status: SHIPPED | OUTPATIENT
Start: 2023-10-25

## 2023-10-25 RX ORDER — BUSPIRONE HYDROCHLORIDE 10 MG/1
10 TABLET ORAL 3 TIMES DAILY
Qty: 60 TABLET | Refills: 2 | Status: SHIPPED | OUTPATIENT
Start: 2023-10-25

## 2023-10-25 RX ORDER — TOBRAMYCIN 3 MG/ML
2 SOLUTION/ DROPS OPHTHALMIC 3 TIMES DAILY
Qty: 5 ML | Refills: 0 | Status: SHIPPED | OUTPATIENT
Start: 2023-10-25

## 2023-10-25 RX ORDER — DICYCLOMINE HCL 20 MG
20 TABLET ORAL EVERY 6 HOURS
Qty: 120 TABLET | Refills: 3 | Status: SHIPPED | OUTPATIENT
Start: 2023-10-25

## 2023-10-25 NOTE — PROGRESS NOTES
Subjective   Blas Alcazar is a 28 y.o. male.     History of Present Illness     Lexapro is helping with anger but still dealing with anxiety  There is some improvement in his overall mood but the anxiety is still quite severe  He feels like it is hard for him to get out of bed in the AM      He did see GI and they recommended xifaxan but insurnace denied this as well  Bentyl helped a little but 3 times a day was not enough  Huge work up ongoing    Amylase and lipase elevated further the last time he had labs drawn with GI.  Pt and wife are concerned about this change  This is concerning to me as well!  MRI abdomen pending from GI who ordered the MRI based on this elevation        The following portions of the patient's history were reviewed and updated as appropriate: allergies, current medications, past family history, past medical history, past social history, past surgical history, and problem list.    Review of Systems   Gastrointestinal:  Positive for abdominal pain.       Objective   Physical Exam  Vitals and nursing note reviewed.   Constitutional:       General: He is not in acute distress.     Appearance: Normal appearance. He is well-developed.   Cardiovascular:      Rate and Rhythm: Normal rate and regular rhythm.      Heart sounds: Normal heart sounds.   Pulmonary:      Effort: Pulmonary effort is normal.      Breath sounds: Normal breath sounds.   Neurological:      Mental Status: He is alert and oriented to person, place, and time.   Psychiatric:         Mood and Affect: Mood normal.         Behavior: Behavior normal.         Thought Content: Thought content normal.         Judgment: Judgment normal.         Assessment & Plan   Diagnoses and all orders for this visit:    1. Generalized anxiety disorder (Primary)  -     escitalopram (Lexapro) 20 MG tablet; Take 1 tablet by mouth Daily.  Dispense: 30 tablet; Refill: 2  -     busPIRone (BUSPAR) 10 MG tablet; Take 1 tablet by mouth 3 (Three) Times a Day.   Dispense: 60 tablet; Refill: 2  -     Ambulatory Referral to Behavioral Health    2. Irritability  -     escitalopram (Lexapro) 20 MG tablet; Take 1 tablet by mouth Daily.  Dispense: 30 tablet; Refill: 2  -     busPIRone (BUSPAR) 10 MG tablet; Take 1 tablet by mouth 3 (Three) Times a Day.  Dispense: 60 tablet; Refill: 2  -     Ambulatory Referral to Behavioral Health    3. Nausea and vomiting, unspecified vomiting type  -     ondansetron ODT (ZOFRAN-ODT) 4 MG disintegrating tablet; Place 1 tablet on the tongue Every 8 (Eight) Hours As Needed for Nausea or Vomiting.  Dispense: 30 tablet; Refill: 2    4. Irritable bowel syndrome with both constipation and diarrhea  -     dicyclomine (BENTYL) 20 MG tablet; Take 1 tablet by mouth Every 6 (Six) Hours.  Dispense: 120 tablet; Refill: 3    5. Elevated lipase  -     Amylase  -     Lipase    6. Serum amylase elevated  -     Amylase  -     Lipase    Other orders  -     tobramycin (Tobrex) 0.3 % solution ophthalmic solution; Administer 2 drops to the right eye 3 (Three) Times a Day.  Dispense: 5 mL; Refill: 0    Anxiety still present even with overall improvement in mood.  Will increase lexapro to 20 and add buspar at their request.  They will call back in one month with update.    Ok bentyl for pain and will use every 6 hours as every 8 hours was not often enough.  This did help his GI pain.  Will recheck amlyase and lipase in 1-2 weeks to monitor and agrr with abdominal MRI as per GI  Ok eye drops if eye not better.  Minimal redness noted today             Answers submitted by the patient for this visit:  Primary Reason for Visit (Submitted on 10/25/2023)  What is the primary reason for your visit?: Abdominal Pain

## 2023-10-26 ENCOUNTER — HOSPITAL ENCOUNTER (OUTPATIENT)
Dept: MRI IMAGING | Facility: HOSPITAL | Age: 28
Discharge: HOME | End: 2023-10-26
Admitting: PHYSICIAN ASSISTANT
Payer: COMMERCIAL

## 2023-10-26 ENCOUNTER — TELEPHONE (OUTPATIENT)
Dept: GASTROENTEROLOGY | Facility: CLINIC | Age: 28
End: 2023-10-26
Payer: COMMERCIAL

## 2023-10-26 DIAGNOSIS — R74.8 ELEVATED LIPASE: ICD-10-CM

## 2023-10-26 DIAGNOSIS — R74.8 ELEVATED AMYLASE: ICD-10-CM

## 2023-10-26 DIAGNOSIS — R11.0 NAUSEA: ICD-10-CM

## 2023-10-26 DIAGNOSIS — R10.84 GENERALIZED ABDOMINAL PAIN: ICD-10-CM

## 2023-10-26 PROCEDURE — A9577 INJ MULTIHANCE: HCPCS | Performed by: PHYSICIAN ASSISTANT

## 2023-10-26 PROCEDURE — 74183 MRI ABD W/O CNTR FLWD CNTR: CPT

## 2023-10-26 PROCEDURE — 0 GADOBENATE DIMEGLUMINE 529 MG/ML SOLUTION: Performed by: PHYSICIAN ASSISTANT

## 2023-10-26 RX ADMIN — GADOBENATE DIMEGLUMINE 14 ML: 529 INJECTION, SOLUTION INTRAVENOUS at 18:56

## 2023-10-26 NOTE — TELEPHONE ENCOUNTER
Caller: LAKESHIA MARTINEZ    Best call back number: [phone]    What is the best time to reach you:    Who are you requesting to speak with (clinical staff, provider,  specific staff member): DIDN'T LEAVE A VOICEMAIL, BUT PHONE CALL MAY HAVE BEEN IN REGARDS TO THE MRI ON SCHEDULE FOR TONIGHT    Do you know the name of the person who called: N/A    What was the call regarding:     Is it okay if the provider responds through MyChart:

## 2023-10-30 RX ORDER — SODIUM, POTASSIUM,MAG SULFATES 17.5-3.13G
2 SOLUTION, RECONSTITUTED, ORAL ORAL TAKE AS DIRECTED
Qty: 354 ML | Refills: 0 | Status: SHIPPED | OUTPATIENT
Start: 2023-10-30

## 2023-11-02 ENCOUNTER — LAB (OUTPATIENT)
Dept: FAMILY MEDICINE CLINIC | Facility: CLINIC | Age: 28
End: 2023-11-02
Payer: COMMERCIAL

## 2023-11-03 LAB
AMYLASE SERPL-CCNC: 218 U/L (ref 31–110)
LIPASE SERPL-CCNC: 239 U/L (ref 13–78)

## 2023-11-06 ENCOUNTER — TELEPHONE (OUTPATIENT)
Dept: GASTROENTEROLOGY | Facility: CLINIC | Age: 28
End: 2023-11-06
Payer: COMMERCIAL

## 2023-11-06 NOTE — TELEPHONE ENCOUNTER
Provider: VISHAL    Caller: SNOW LEE    Relationship to Patient: MOM    Phone Number: [phone]    Reason for Call: PT IS NEEDING TO KNOW IF THEY WILL BE COVERED FOR COLONOSCOPY AND EGD. PT WAS TOLD HE WOULD NOT BE COVERED FOR HIDA SCAN AND GASTRIC EMPTYING SO MOM IS CHECKING IN ON EVERYTHING TO ENSURE HE DOESN'T GET BILLED OUT OF NETWORK

## 2023-11-07 ENCOUNTER — TELEPHONE (OUTPATIENT)
Dept: GASTROENTEROLOGY | Facility: CLINIC | Age: 28
End: 2023-11-07
Payer: COMMERCIAL

## 2023-11-07 NOTE — TELEPHONE ENCOUNTER
Contacted patient and informed that no prior aurhotization is required for his procedures as scheduled. Patient requesting information about possible dollar amount due, requested he call estimates at [phone]

## 2023-11-07 NOTE — TELEPHONE ENCOUNTER
If we could contact patients mother Sydney Rasta in regards to the patient possibly getting scheduled in ENDO ( HOSPITAL ) As [facility] and [facility] do not take the patients current insurance.      Sydney may be reached at [phone].

## 2023-11-07 NOTE — TELEPHONE ENCOUNTER
"Patient's mother Sydney calling to inquire about patient's scheduled gastric emptying study (GES) and HIDA scan.    She reports that someone from nuclear medicine called her to inform that we would be considered \"out of network\" for the recommended studies. She does not recall with whom she spoke, and is requesting review to ensure that these diagnostic scheduled appointments will be considered in network.    Please advise.  "

## 2023-11-08 ENCOUNTER — TELEPHONE (OUTPATIENT)
Dept: GASTROENTEROLOGY | Facility: CLINIC | Age: 28
End: 2023-11-08

## 2023-11-08 NOTE — TELEPHONE ENCOUNTER
Provider: LUPIS RODGERS PA-C    Caller: SNOW LAZARO    Relationship to Patient: PARENT    Phone Number: [phone]    Reason for Call: PT'S MOM CALLED IN STATING THAT SHE WOULD LIKE TO SPEAK WITH SOMEONE IN THE OFFICE ABOUT THE PT BEING ABLE TO CONTINUE CARE WITH THE [facility]// THE PT'S INSURANCE IS OUT OF NETWORK WITH THE SURGERY CENTER// PT WOULD PREFER TO SPEAK TO THE PRACTICE'S MANAGER// PLEASE CALL THE PT OR THE PT'S MOTHER BACK

## 2023-11-10 ENCOUNTER — TELEPHONE (OUTPATIENT)
Dept: FAMILY MEDICINE CLINIC | Facility: CLINIC | Age: 28
End: 2023-11-10
Payer: COMMERCIAL

## 2023-11-10 NOTE — TELEPHONE ENCOUNTER
Caller: SNOW LEE    Relationship: Mother    Best call back number: [phone]    What is the best time to reach you: ANYTIME     Who are you requesting to speak with (clinical staff, provider,  specific staff member):     What was the call regarding: PATIENT'S MOTHER IS CALLING TO GET SOME HELP. SHE SAYS THAT THE LOVE THE GASTRO DOCTOR THAT HE WAS SCHEDULED WITH BUT THE OFFICE HAS BEEN VERY UNHELPFUL. SHE SAYS THAT THE PATIENT IS IN NEED OF SOME PROCEDURES AND HE IS BEING TOLD THAT THE PLACE THAT HE WAS REFERRED TO IS OUT OF NETWORK. THE OFFICE HAS TOLD HER SEVERAL TIME THAT THAT IS INCORRECT AND THAT THE [facility] IS IN NETWORK FOR THE PATIENT. THEY ASKED FOR ANOTHER REFERRAL THAT WAS IN NETWORK AND THE  TOLD THEM THAT IT WAS NOT THEIR JOB TO FIND A PLACE THAT IS IN NETWORK FOR THEM. SHE IS UNSURE WHAT TO DO AT THIS POINT AND WHO TO SPEAK TO. SHE IS HOPING THAT THE PCP'S OFFICE CAN PROVIDE SOME GUIDANCE OR EVEN REACH OUT TO THE GASTRO OFFICE.     Is it okay if the provider responds through [facility]: YES

## 2023-11-13 ENCOUNTER — TELEPHONE (OUTPATIENT)
Dept: GASTROENTEROLOGY | Facility: CLINIC | Age: 28
End: 2023-11-13
Payer: COMMERCIAL

## 2023-11-13 NOTE — TELEPHONE ENCOUNTER
Patient was to receive procedures by Dr. Brice at [facility] but upon learning that they do not accept his insurance, needing to schedule at hospital ENDOSCOPY unit.    Attempted to contact patient, no answer or option to LVM.

## 2023-11-15 ENCOUNTER — TELEPHONE (OUTPATIENT)
Dept: GASTROENTEROLOGY | Facility: CLINIC | Age: 28
End: 2023-11-15

## 2023-11-15 NOTE — TELEPHONE ENCOUNTER
Hub staff attempted to follow warm transfer process and was unsuccessful     Caller: SNOW LEE    Relationship to patient: Mother    Best call back number: [phone]    Patient is needing: PT MOM CALLED TO SPEAK WITH A . SHE WAS TOLD PT NEEDED TO RESCHEDULE APPT FOR 12/06/2023. PLEASE GIVE PT MOM A CALL BACK. IF NOT ABLE TO REACH HER. SHE DID SAY IT WAS OKAY TO LEAVE AN VOICEMAIL.

## 2023-11-28 ENCOUNTER — PATIENT MESSAGE (OUTPATIENT)
Dept: FAMILY MEDICINE CLINIC | Facility: CLINIC | Age: 28
End: 2023-11-28

## 2023-11-29 ENCOUNTER — TELEPHONE (OUTPATIENT)
Dept: FAMILY MEDICINE CLINIC | Facility: CLINIC | Age: 28
End: 2023-11-29
Payer: COMMERCIAL

## 2023-11-29 NOTE — TELEPHONE ENCOUNTER
----- Message from Blas Alcazar sent at 11/28/2023 11:27 AM EST -----  Regarding: Monthly update of higher dose Lexapro and beginning buspar  Contact: [phone]  [facility] Doctor Rylee,    Since I seen you last, I have not noticed any significant differences with the higher dose of Lexapro. Nothing negative, but not necessarily positive. I think it is doing it's job as far as lessening the constant irritation I was experiencing before, but I'm unsure about it's effects on my anxiety and motivation. Also, thank you for hearing me out about my persistent severe anxiety; however, the buspar seemingly makes my anxiety worse by giving me a jittery feeling and adding to the feeling of panic by not providing any relief.     My thoughts about what we can do to continue my care plan of getting my depression and anxiety to a tolerable level while I work to find out the underlying health issue I'm struggling with is to try another anxiety medication. Panic attacks occur and even when the panic is over, the anxiety remains all day. I trust the Lexapro is helping some with anxiety, but I can't tell any difference. I would like to try an anxiety medication that works quickly and can ease the all-day anxiety I experience and also halts or eases panic attacks as they arise. Can we try alprazolam or clonazepam as a part of my next steps in my care plan?    Thank you for hearing my concerns. I look forward to hearing back from you!    Blas

## 2023-11-29 NOTE — TELEPHONE ENCOUNTER
Caller: Blas Alcazar    Relationship: Self    Best call back number: [phone]     What is the best time to reach you: ANY    Who are you requesting to speak with (clinical staff, provider,  specific staff member): DR. GONZALEZ    What was the call regarding: THE PATIENT IS CALLING TO MAKE SURE THAT DR. GONZALEZ RECEIVED THE MESSAGE ON [employer]. THE PATIENT SAID THAT IT WAS TOO LONG TO GET INTO BUT WOULD LIKE A RESPONSE AND TO KNOW DR GONZALEZ'S THOUGHTS. PLEASE LET HIM KNOW ASAP.     Is it okay if the provider responds through [facility]: YES

## 2023-12-01 NOTE — TELEPHONE ENCOUNTER
This is something we would discuss in person but he did not keep his appointment!    He has been referred to psych as of 10/25/2023

## 2023-12-08 ENCOUNTER — OFFICE VISIT (OUTPATIENT)
Dept: FAMILY MEDICINE CLINIC | Facility: CLINIC | Age: 28
End: 2023-12-08
Payer: COMMERCIAL

## 2023-12-08 VITALS
TEMPERATURE: 98.4 F | OXYGEN SATURATION: 97 % | BODY MASS INDEX: 20.99 KG/M2 | HEIGHT: 72 IN | WEIGHT: 155 LBS | HEART RATE: 82 BPM | RESPIRATION RATE: 16 BRPM | SYSTOLIC BLOOD PRESSURE: 112 MMHG | DIASTOLIC BLOOD PRESSURE: 70 MMHG

## 2023-12-08 DIAGNOSIS — K58.0 IRRITABLE BOWEL SYNDROME WITH DIARRHEA: ICD-10-CM

## 2023-12-08 DIAGNOSIS — R45.4 IRRITABILITY: ICD-10-CM

## 2023-12-08 DIAGNOSIS — F41.1 GENERALIZED ANXIETY DISORDER: Primary | ICD-10-CM

## 2023-12-08 DIAGNOSIS — R10.84 GENERALIZED ABDOMINAL PAIN: ICD-10-CM

## 2023-12-08 PROCEDURE — 99214 OFFICE O/P EST MOD 30 MIN: CPT | Performed by: FAMILY MEDICINE

## 2023-12-08 RX ORDER — BUPROPION HYDROCHLORIDE 150 MG/1
TABLET ORAL
Qty: 60 TABLET | Refills: 2 | Status: SHIPPED | OUTPATIENT
Start: 2023-12-08

## 2023-12-08 RX ORDER — ESCITALOPRAM OXALATE 10 MG/1
10 TABLET ORAL DAILY
Qty: 30 TABLET | Refills: 3 | Status: SHIPPED | OUTPATIENT
Start: 2023-12-08

## 2023-12-08 NOTE — PROGRESS NOTES
Subjective   Blas Alcazar is a 28 y.o. male.     History of Present Illness     Overall his mood has been the same  He is still very anxious  No change with the lexapro 20 and he did not like the buspar  Wellbutrin worked for mom  They did see a counselor outside form       GI work up is ongoing but place they scheduled was out of network  Tests were ordered but not completed due to insurance coverage  He continues to have pain, N/V/D  Diarrhea is more often      The following portions of the patient's history were reviewed and updated as appropriate: allergies, current medications, past family history, past medical history, past social history, past surgical history, and problem list.    Review of Systems   Constitutional: Negative.    Gastrointestinal:  Positive for abdominal pain, diarrhea, nausea and vomiting.   Psychiatric/Behavioral:  The patient is not nervous/anxious.        Objective   Physical Exam  Vitals and nursing note reviewed.   Constitutional:       General: He is not in acute distress.     Appearance: Normal appearance. He is well-developed.   Cardiovascular:      Rate and Rhythm: Normal rate and regular rhythm.      Heart sounds: Normal heart sounds.   Pulmonary:      Effort: Pulmonary effort is normal.      Breath sounds: Normal breath sounds.   Neurological:      Mental Status: He is alert and oriented to person, place, and time.   Psychiatric:         Mood and Affect: Mood normal.         Behavior: Behavior normal.         Thought Content: Thought content normal.         Judgment: Judgment normal.       Assessment & Plan   Diagnoses and all orders for this visit:    1. Generalized anxiety disorder (Primary)  -     escitalopram (Lexapro) 10 MG tablet; Take 1 tablet by mouth Daily.  Dispense: 30 tablet; Refill: 3  -     buPROPion XL (Wellbutrin XL) 150 MG 24 hr tablet; 1 PO QD one week and then 1 PO BID  Dispense: 60 tablet; Refill: 2    2. Irritability  -     escitalopram (Lexapro) 10 MG  tablet; Take 1 tablet by mouth Daily.  Dispense: 30 tablet; Refill: 3  -     buPROPion XL (Wellbutrin XL) 150 MG 24 hr tablet; 1 PO QD one week and then 1 PO BID  Dispense: 60 tablet; Refill: 2    3. Irritable bowel syndrome with diarrhea  -     NM gastric emptying; Future  -     NM HIDA SCAN WITH PHARMACOLOGICAL INTERVENTION; Future  -     Ambulatory Referral For Screening Colonoscopy    4. Generalized abdominal pain  -     NM gastric emptying; Future  -     NM HIDA SCAN WITH PHARMACOLOGICAL INTERVENTION; Future  -     Ambulatory Referral For Screening Colonoscopy    Anxiety is worse, buspar did not help.  Will use lexapro 10 and add wellbutrin to regimen.  This really helped mom's anxiety.  He will call in one month with update. He did see a counselor without benefit and not interested in further counseling at this time.    Will work on HID, Gastric emptying, and colonoscopy at facility that takes his insurance.  Needs GI work up to be completed!